# Patient Record
Sex: FEMALE | Race: BLACK OR AFRICAN AMERICAN | URBAN - METROPOLITAN AREA
[De-identification: names, ages, dates, MRNs, and addresses within clinical notes are randomized per-mention and may not be internally consistent; named-entity substitution may affect disease eponyms.]

---

## 2021-03-01 ENCOUNTER — EMERGENCY (EMERGENCY)
Facility: HOSPITAL | Age: 41
LOS: 0 days | Discharge: HOME | End: 2021-03-01
Attending: EMERGENCY MEDICINE | Admitting: EMERGENCY MEDICINE
Payer: MEDICAID

## 2021-03-01 VITALS
OXYGEN SATURATION: 99 % | WEIGHT: 279.99 LBS | SYSTOLIC BLOOD PRESSURE: 135 MMHG | DIASTOLIC BLOOD PRESSURE: 83 MMHG | RESPIRATION RATE: 17 BRPM | TEMPERATURE: 98 F | HEART RATE: 63 BPM
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
## 2021-03-01 DIAGNOSIS — J45.909 UNSPECIFIED ASTHMA, UNCOMPLICATED: ICD-10-CM

## 2021-03-01 DIAGNOSIS — S59.901A UNSPECIFIED INJURY OF RIGHT ELBOW, INITIAL ENCOUNTER: ICD-10-CM
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
## 2021-03-01 DIAGNOSIS — S09.90XA UNSPECIFIED INJURY OF HEAD, INITIAL ENCOUNTER: ICD-10-CM

## 2021-03-01 DIAGNOSIS — M25.572 PAIN IN LEFT ANKLE AND JOINTS OF LEFT FOOT: ICD-10-CM

## 2021-03-01 DIAGNOSIS — S99.912A UNSPECIFIED INJURY OF LEFT ANKLE, INITIAL ENCOUNTER: ICD-10-CM

## 2021-03-01 DIAGNOSIS — S80.912A UNSPECIFIED SUPERFICIAL INJURY OF LEFT KNEE, INITIAL ENCOUNTER: ICD-10-CM

## 2021-03-01 PROCEDURE — 73630 X-RAY EXAM OF FOOT: CPT | Mod: 26,LT

## 2021-03-01 PROCEDURE — 73600 X-RAY EXAM OF ANKLE: CPT | Mod: 26,LT

## 2021-03-01 PROCEDURE — 73070 X-RAY EXAM OF ELBOW: CPT | Mod: 26,RT

## 2021-03-01 PROCEDURE — 73562 X-RAY EXAM OF KNEE 3: CPT | Mod: 26,LT

## 2021-03-01 PROCEDURE — 99284 EMERGENCY DEPT VISIT MOD MDM: CPT

## 2021-03-01 RX ORDER — IBUPROFEN 200 MG
600 TABLET ORAL ONCE
Refills: 0 | Status: COMPLETED | OUTPATIENT
Start: 2021-03-01 | End: 2021-03-01

## 2021-03-01 RX ORDER — METHOCARBAMOL 500 MG/1
1 TABLET, FILM COATED ORAL
Qty: 21 | Refills: 0
Start: 2021-03-01 | End: 2021-03-07

## 2021-03-01 RX ORDER — IBUPROFEN 200 MG
1 TABLET ORAL
Qty: 21 | Refills: 0
Start: 2021-03-01 | End: 2021-03-07

## 2021-03-01 RX ADMIN — Medication 600 MILLIGRAM(S): at 11:54

## 2021-03-01 NOTE — ED ADULT TRIAGE NOTE - CHIEF COMPLAINT QUOTE
pt c/o L ankle pain , R elbow and head pain s/p trip and fall down ramp while visiting a patients home

## 2021-03-01 NOTE — ED PROVIDER NOTE - CARE PLAN
Principal Discharge DX:	Fall   Principal Discharge DX:	Fall  Secondary Diagnosis:	Knee injury, left, initial encounter  Secondary Diagnosis:	Ankle injury, left, initial encounter  Secondary Diagnosis:	Elbow injury, right, initial encounter  Secondary Diagnosis:	CHI (closed head injury), initial encounter

## 2021-03-01 NOTE — ED PROVIDER NOTE - OBJECTIVE STATEMENT
H 39 y/o female with hx Asthma presents to the ED c/o "I slipped on wet floor and hurt my left ankle/ foot/ knee and right elbow. I hit my head too but I didn't pass out." no LOC/ dizziness/ nausea/ vomit/ weakness

## 2021-03-01 NOTE — ED PROVIDER NOTE - ATTENDING CONTRIBUTION TO CARE
39 y/o female h/o asthma, BTL, non-smoker presents s/p slip and fall on ramp outside a client's house PTA, states hit back of head, c/o rt elbow, lft knee / ankle / foot pain, sx are constant, worse with certain movements and position, better with rest, denies fever, tactile temp, chills, paresthesias, focal weakness, or other associated complaints at present, denies LOC, n/v, visual complaints, paresthesias or other complaints at present.     No old chart available for review.  I have reviewed and agree with the nursing note, except as documented in my note.    VSS, awake, alert, non-toxic appearing, Head NC / NT, PERRL / EOMI, no hemotympanum, no abrasions or lacerations, chest CTAB, chest wall NT, no w/r/r, +S1/S2, RRR, no m/r/g, abdomen soft, NT, ND, +BS, able to voluntarily actively rotate neck 45 degrees left and right on request, no midline spinal tenderness, no CVA tenderness, FROM x 4, no bony point tenderness, motor and sensation intact x 4, NV intact x 4, alert and oriented to person, place and time, clear speech, extremity strength is 5/5 and equal.

## 2021-03-01 NOTE — ED PROVIDER NOTE - NSFOLLOWUPINSTRUCTIONS_ED_ALL_ED_FT
Closed Head Injury    A closed head injury is an injury to your head that may or may not involve a traumatic brain injury (TBI). Symptoms of TBI can be short or long lasting and include headache, dizziness, interference with memory or speech, fatigue, confusion, changes in sleep, mood changes, nausea, depression/anxiety, and dulling of senses. Make sure to obtain proper rest which includes getting plenty of sleep, avoiding excessive visual stimulation, and avoiding activities that may cause physical or mental stress. Avoid any situation where there is potential for another head injury, including sports.    SEEK IMMEDIATE MEDICAL CARE IF YOU HAVE ANY OF THE FOLLOWING SYMPTOMS: unusual drowsiness, vomiting, severe dizziness, seizures, lightheadedness, muscular weakness, different pupil sizes, visual changes, or clear or bloody discharge from your ears or nose.    Ankle Sprain    An ankle sprain is a stretch or tear in one of the tough, fiber-like tissues (ligaments) in the ankle. The ligaments in your ankle help to hold the bones of the ankle together.     CAUSES  This condition is often caused by stepping on or falling on the outer edge of the foot.    RISK FACTORS  This condition is more likely to develop in people who play sports.    SYMPTOMS  Symptoms of this condition include:    Pain in your ankle.  Swelling.  Bruising. Bruising may develop right after you sprain your ankle or 1–2 days later.  Trouble standing or walking, especially when you turn or change directions.    DIAGNOSIS  This condition is diagnosed with a physical exam. During the exam, your health care provider will press on certain parts of your foot and ankle and try to move them in certain ways. X-rays may be taken to see how severe the sprain is and to check for broken bones.    TREATMENT  This condition may be treated with:    A brace. This is used to keep the ankle from moving until it heals.  An elastic bandage. This is used to support the ankle.  Crutches.  Pain medicine.  Surgery. This may be needed if the sprain is severe.  Physical therapy. This may help to improve the range of motion in the ankle.    HOME CARE INSTRUCTIONS  Rest your ankle.  Take over-the-counter and prescription medicines only as told by your health care provider.  For 2–3 days, keep your ankle raised (elevated) above the level of your heart as much as possible.  If directed, apply ice to the area:  Put ice in a plastic bag.  Place a towel between your skin and the bag.  Leave the ice on for 20 minutes, 2–3 times a day.  If you were given a brace:  Wear it as directed.  Remove it to shower or bathe.  Try not to move your ankle much, but wiggle your toes from time to time. This helps to prevent swelling.  If you were given an elastic bandage (dressing):  Remove it to shower or bathe.  Try not to move your ankle much, but wiggle your toes from time to time. This helps to prevent swelling.  Adjust the dressing to make it more comfortable if it feels too tight.  Loosen the dressing if you have numbness or tingling in your foot, or if your foot becomes cold and blue.  If you have crutches, use them as told by your health care provider. Continue to use them until you can walk without feeling pain in your ankle.    SEEK MEDICAL CARE IF:  You have rapidly increasing bruising or swelling.  Your pain is not relieved with medicine.    SEEK IMMEDIATE MEDICAL CARE IF:  Your toes or foot becomes numb or blue.  You have severe pain that gets worse.    ADDITIONAL NOTES AND INSTRUCTIONS    Please follow up with your Primary MD in 24-48 hr.  Seek immediate medical care for any new/worsening signs or symptoms.     RICE for Routine Care of Injuries  The routine care of many injuries includes rest, ice, compression, and elevation (RICE therapy). RICE therapy is often recommended for injuries to soft tissues, such as a muscle strain, ligament injuries, bruises, and overuse injuries. It can also be used for some bony injuries. Using RICE therapy can help to relieve pain, lessen swelling, and enable your body to heal.    Rest  Rest is required to allow your body to heal. This usually involves reducing your normal activities and avoiding use of the injured part of your body. Generally, you can return to your normal activities when you are comfortable and have been given permission by your health care provider.    Ice  Image   Icing your injury helps to keep the swelling down, and it lessens pain. Do not apply ice directly to your skin.    Put ice in a plastic bag.  Place a towel between your skin and the bag.  Leave the ice on for 20 minutes, 2–3 times a day.    Do this for as long as you are directed by your health care provider.    Compression  Compression means putting pressure on the injured area. Compression helps to keep swelling down, gives support, and helps with discomfort. Compression may be done with an elastic bandage. If an elastic bandage has been applied, follow these general tips:    Remove and reapply the bandage every 3–4 hours or as directed by your health care provider.  Make sure the bandage is not wrapped too tightly, because this can cut off circulation. If part of your body beyond the bandage becomes blue, numb, cold, swollen, or more painful, your bandage is most likely too tight. If this occurs, remove your bandage and reapply it more loosely.  See your health care provider if the bandage seems to be making your problems worse rather than better.    Elevation  Elevation means keeping the injured area raised. This helps to lessen swelling and decrease pain. If possible, your injured area should be elevated at or above the level of your heart or the center of your chest.    When should I seek medical care?  If your pain and swelling continue.  If your symptoms are getting worse rather than improving.  These symptoms may indicate that further evaluation or further X-rays are needed. Sometimes, X-rays may not show a small broken bone (fracture) until a number of days later. Make a follow-up appointment with your health care provider.    When should I seek immediate medical care?  If you have sudden severe pain at or below the area of your injury.  If you have redness or increased swelling around your injury.  If you have tingling or numbness at or below the area of your injury that does not improve after you

## 2021-03-01 NOTE — ED PROVIDER NOTE - SECONDARY DIAGNOSIS.
Ankle injury, left, initial encounter Elbow injury, right, initial encounter CHI (closed head injury), initial encounter Knee injury, left, initial encounter

## 2021-03-01 NOTE — ED ADULT NURSE NOTE - NSFALLRSKHARMRISK_ED_ALL_ED
How Severe Are Your Spot(S)?: mild What Is The Reason For Today's Visit?: Full Body Skin Examination What Is The Reason For Today's Visit? (Being Monitored For X): concerning skin lesions on an annual basis yes

## 2021-03-01 NOTE — ED PROVIDER NOTE - PATIENT PORTAL LINK FT
You can access the FollowMyHealth Patient Portal offered by [facility] by registering at the following website: [email or web address]. By joining [employer]’s FollowMyHealth portal, you will also be able to view your health information using other applications (apps) compatible with our system.

## 2021-03-01 NOTE — ED PROVIDER NOTE - NSFOLLOWUPCLINICS_GEN_ALL_ED_FT
[facility]  Outpatient Clinic  [address], [place]   Phone: [phone]  Fax:   Follow Up Time:     [facility] ([facility])  Rehabilitation  [facility] [address], [address] [place] [address]  Phone: [phone]  Fax:   Follow Up Time:

## 2021-03-01 NOTE — ED ADULT NURSE NOTE - NSIMPLEMENTINTERV_GEN_ALL_ED
Implemented All Fall with Harm Risk Interventions:  [place] to call system. Call bell, personal items and telephone within reach. Instruct patient to call for assistance. Room bathroom lighting operational. Non-slip footwear when patient is off stretcher. Physically safe environment: no spills, clutter or unnecessary equipment. Stretcher in lowest position, wheels locked, appropriate side rails in place. Provide visual cue, wrist band, yellow gown, etc. Monitor gait and stability. Monitor for mental status changes and reorient to person, place, and time. Review medications for side effects contributing to fall risk. Reinforce activity limits and safety measures with patient and family. Provide visual clues: red socks.

## 2021-10-26 ENCOUNTER — EMERGENCY (EMERGENCY)
Facility: HOSPITAL | Age: 41
LOS: 0 days | Discharge: HOME | End: 2021-10-27
Attending: EMERGENCY MEDICINE | Admitting: EMERGENCY MEDICINE
Payer: MEDICAID

## 2021-10-26 VITALS
SYSTOLIC BLOOD PRESSURE: 133 MMHG | WEIGHT: 186.07 LBS | OXYGEN SATURATION: 99 % | DIASTOLIC BLOOD PRESSURE: 73 MMHG | TEMPERATURE: 98 F | RESPIRATION RATE: 20 BRPM | HEART RATE: 101 BPM

## 2021-10-26 DIAGNOSIS — M25.562 PAIN IN LEFT KNEE: ICD-10-CM

## 2021-10-26 DIAGNOSIS — M54.59 OTHER LOW BACK PAIN: ICD-10-CM
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
## 2021-10-26 DIAGNOSIS — G89.29 OTHER CHRONIC PAIN: ICD-10-CM
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
## 2021-10-26 DIAGNOSIS — M25.572 PAIN IN LEFT ANKLE AND JOINTS OF LEFT FOOT: ICD-10-CM

## 2021-10-26 DIAGNOSIS — J45.909 UNSPECIFIED ASTHMA, UNCOMPLICATED: ICD-10-CM

## 2021-10-26 PROCEDURE — 99284 EMERGENCY DEPT VISIT MOD MDM: CPT

## 2021-10-26 RX ORDER — KETOROLAC TROMETHAMINE 30 MG/ML
60 SYRINGE (ML) INJECTION ONCE
Refills: 0 | Status: DISCONTINUED | OUTPATIENT
Start: 2021-10-26 | End: 2021-10-26

## 2021-10-26 RX ORDER — KETOROLAC TROMETHAMINE 30 MG/ML
30 SYRINGE (ML) INJECTION ONCE
Refills: 0 | Status: DISCONTINUED | OUTPATIENT
Start: 2021-10-26 | End: 2021-10-26

## 2021-10-26 RX ORDER — METHOCARBAMOL 500 MG/1
2 TABLET, FILM COATED ORAL
Qty: 30 | Refills: 0
Start: 2021-10-26 | End: 2021-10-30

## 2021-10-26 RX ORDER — METHOCARBAMOL 500 MG/1
1000 TABLET, FILM COATED ORAL ONCE
Refills: 0 | Status: COMPLETED | OUTPATIENT
Start: 2021-10-26 | End: 2021-10-26

## 2021-10-26 RX ADMIN — METHOCARBAMOL 1000 MILLIGRAM(S): 500 TABLET, FILM COATED ORAL at 20:36

## 2021-10-26 RX ADMIN — Medication 60 MILLIGRAM(S): at 20:40

## 2021-10-26 NOTE — ED PROVIDER NOTE - OBJECTIVE STATEMENT
40 yo F pmhx asthma presenting to the ED for evaluation of R sided lower back pain, pt had mechanical fall 4 days ago, states she fell backwards off a chair, denies any head trauma, loc, no anticoag use. Pt reports she has chronic L ankle and L knee pain, L knee locks sometimes, pt states she fell a year ago and has chronic pain, pt saw Dr Staples earlier today for L knee and L ankle pain, pt states she was not prescribed anything for pain. Pt has no difficulty ambulating. Denies any fever, chills, nausea, vomiting, weakness, numbness/tingling, incontinence, saddle anesthesia.

## 2021-10-26 NOTE — ED ADULT NURSE NOTE - OBJECTIVE STATEMENT
Pt states pt fell in march and injured L ankle. Pt c/o difficulty ambulating at times due to LLE weakness.

## 2021-10-26 NOTE — ED PROVIDER NOTE - PHYSICAL EXAMINATION
GENERAL: Well-nourished, Well-developed. NAD.  HEAD: No visible or palpable bumps or hematomas. No ecchymosis behind ears B/L.  Eyes: PERRLA, EOMI. No asymmetry. No nystagmus. No conjunctival injection. Non-icteric sclera.  ENMT: MMM.   Neck: Supple. No cervical midline TTP. No paravertebral TTP to traps. FROM  CVS: RRR. Normal S1,S2. No murmurs appreciated on auscultation   RESP: No use of accessory muscles. Chest rise symmetrical with good expansion. Lungs clear to auscultation B/L. No wheezing, rales, or rhonchi auscultated.  GI: Normal auscultation of bowel sounds in all 4 quadrants. Soft, Nontender, Nondistended. No guarding or rebound tenderness. No CVAT B/L.  MSK: Extremities w/o deformity or ttp. No visible signs of trauma such as ecchymosis, erythema, or swelling. FROM of upper and lower extremities B/L. No midline spinal TTP. FROM of back with flexion and extension. (+)R sided paraspinal lumbar ttp.   Skin: Warm, Dry. No rashes or lesions. Good cap refill < 2 sec B/L.  EXT: Radial and pedal pulses present B/L. No calf tenderness or swelling B/L. No palpable cords. No pedal edema B/L.  Neuro: AA&O x 3. Sensation grossly intact. Strength 5/5 B/L. Gait within normal limits.

## 2021-10-26 NOTE — ED ADULT TRIAGE NOTE - CHIEF COMPLAINT QUOTE
Pt presents to ED s/p fall x 2. No LOC. No A/C. Pt states pt fell in march and injured L ankle. Pt c/o difficulty ambulating at times due to LLE weakness.

## 2021-10-26 NOTE — ED PROVIDER NOTE - NSFOLLOWUPCLINICS_GEN_ALL_ED_FT
[facility] ([facility])  Rehabilitation  [facility] [address], [address] [place] [address]  Phone: [phone]  Fax:   Follow Up Time: 1-3 Days

## 2021-10-26 NOTE — ED PROVIDER NOTE - NS ED ROS FT
Constitutional: (-) fever (-) malaise (-) diaphoresis (-) chills (-) wt. loss (-) body aches (-) night sweats  Eyes: (-) visual changes (-) eye pain (-) eye discharge (-) photophobia (-) FB sensation  Neck: (-) neck pain (-) neck stiffness  Cardiac: (-) chest pain  (-) palpitations (-) syncope (-) edema  Respiratory: (-) cough (-) SOB (-) EUGENE  GI: (-) nausea (-) vomiting (-) diarrhea (-) abdominal pain   : (-) dysuria (-) increased frequency  (-) hematuria (-) incontinence  MS: (+) back pain (-) myalgia (-) muscle weakness (-)  joint pain  Neuro: (-) headache (-) dizziness (-) numbness/tingling to extremities B/L (-) weakness (-) LOC (-) head injury (-) AMS (-) saddle anesthesia (-) tremors  Skin: (-) rash (-) laceration    Except as documented in the HPI, all other systems are negative.

## 2021-10-26 NOTE — ED PROVIDER NOTE - ATTENDING CONTRIBUTION TO CARE
41 y.o. F, PMH of asthma, presenting to the ED for evaluation of R sided lower back pain, pt had mechanical fall 4 days ago, states she fell backwards off a chair, denies any head trauma, LOC. No anticoagulant use. Pt has no difficulty ambulating. Denies any fever, chills, nausea, vomiting, weakness, numbness/tingling, incontinence, saddle anesthesia. On exam, pt in NAD, AAOx3, head NC/AT, CN II-XII intact, lungs CTA B/L, CV S1S2 regular, abdomen soft/NT/ND/(+)BS, back (-) midline tenderness, (+) right lower paraspinal tenderness, ext (-) edema, (-) saddle anesthesia, motor 5/5x4, sensation intact. Most likely MSK. Will d/c.

## 2021-10-27 PROBLEM — J45.909 UNSPECIFIED ASTHMA, UNCOMPLICATED: Chronic | Status: ACTIVE | Noted: 2021-03-01

## 2022-08-09 PROBLEM — Z00.00 ENCOUNTER FOR PREVENTIVE HEALTH EXAMINATION: Status: ACTIVE | Noted: 2022-08-09

## 2022-09-09 ENCOUNTER — APPOINTMENT (OUTPATIENT)
Dept: ORTHOPEDIC SURGERY | Facility: CLINIC | Age: 42
End: 2022-09-09

## 2022-10-28 ENCOUNTER — APPOINTMENT (OUTPATIENT)
Dept: ORTHOPEDIC SURGERY | Facility: CLINIC | Age: 42
End: 2022-10-28

## 2022-10-28 PROBLEM — Z00.00 ENCOUNTER FOR PREVENTIVE HEALTH EXAMINATION: Status: ACTIVE | Noted: 2022-10-28

## 2022-11-21 ENCOUNTER — EMERGENCY (EMERGENCY)
Facility: HOSPITAL | Age: 42
LOS: 0 days | Discharge: HOME | End: 2022-11-22
Attending: EMERGENCY MEDICINE | Admitting: EMERGENCY MEDICINE

## 2022-11-21 VITALS
RESPIRATION RATE: 18 BRPM | HEART RATE: 92 BPM | TEMPERATURE: 99 F | HEIGHT: 68 IN | WEIGHT: 293 LBS | OXYGEN SATURATION: 97 % | DIASTOLIC BLOOD PRESSURE: 73 MMHG | SYSTOLIC BLOOD PRESSURE: 137 MMHG

## 2022-11-21 DIAGNOSIS — J45.909 UNSPECIFIED ASTHMA, UNCOMPLICATED: ICD-10-CM

## 2022-11-21 DIAGNOSIS — R07.89 OTHER CHEST PAIN: ICD-10-CM

## 2022-11-21 DIAGNOSIS — Z20.822 CONTACT WITH AND (SUSPECTED) EXPOSURE TO COVID-19: ICD-10-CM

## 2022-11-21 DIAGNOSIS — R07.9 CHEST PAIN, UNSPECIFIED: ICD-10-CM

## 2022-11-21 LAB
BASOPHILS # BLD AUTO: 0.05 K/UL — SIGNIFICANT CHANGE UP (ref 0–0.2)
BASOPHILS NFR BLD AUTO: 0.8 % — SIGNIFICANT CHANGE UP (ref 0–1)
EOSINOPHIL # BLD AUTO: 0.21 K/UL — SIGNIFICANT CHANGE UP (ref 0–0.7)
EOSINOPHIL NFR BLD AUTO: 3.3 % — SIGNIFICANT CHANGE UP (ref 0–8)
HCT VFR BLD CALC: 35.5 % — LOW (ref 37–47)
HGB BLD-MCNC: 11.4 G/DL — LOW (ref 12–16)
IMM GRANULOCYTES NFR BLD AUTO: 0.2 % — SIGNIFICANT CHANGE UP (ref 0.1–0.3)
LYMPHOCYTES # BLD AUTO: 2.82 K/UL — SIGNIFICANT CHANGE UP (ref 1.2–3.4)
LYMPHOCYTES # BLD AUTO: 43.7 % — SIGNIFICANT CHANGE UP (ref 20.5–51.1)
MCHC RBC-ENTMCNC: 28.9 PG — SIGNIFICANT CHANGE UP (ref 27–31)
MCHC RBC-ENTMCNC: 32.1 G/DL — SIGNIFICANT CHANGE UP (ref 32–37)
MCV RBC AUTO: 89.9 FL — SIGNIFICANT CHANGE UP (ref 81–99)
MONOCYTES # BLD AUTO: 0.4 K/UL — SIGNIFICANT CHANGE UP (ref 0.1–0.6)
MONOCYTES NFR BLD AUTO: 6.2 % — SIGNIFICANT CHANGE UP (ref 1.7–9.3)
NEUTROPHILS # BLD AUTO: 2.97 K/UL — SIGNIFICANT CHANGE UP (ref 1.4–6.5)
NEUTROPHILS NFR BLD AUTO: 45.8 % — SIGNIFICANT CHANGE UP (ref 42.2–75.2)
NRBC # BLD: 0 /100 WBCS — SIGNIFICANT CHANGE UP (ref 0–0)
PLATELET # BLD AUTO: 297 K/UL — SIGNIFICANT CHANGE UP (ref 130–400)
RBC # BLD: 3.95 M/UL — LOW (ref 4.2–5.4)
RBC # FLD: 14.5 % — SIGNIFICANT CHANGE UP (ref 11.5–14.5)
TROPONIN T SERPL-MCNC: <0.01 NG/ML — SIGNIFICANT CHANGE UP
WBC # BLD: 6.46 K/UL — SIGNIFICANT CHANGE UP (ref 4.8–10.8)
WBC # FLD AUTO: 6.46 K/UL — SIGNIFICANT CHANGE UP (ref 4.8–10.8)

## 2022-11-21 PROCEDURE — 99285 EMERGENCY DEPT VISIT HI MDM: CPT

## 2022-11-21 PROCEDURE — 93010 ELECTROCARDIOGRAM REPORT: CPT

## 2022-11-21 RX ORDER — KETOROLAC TROMETHAMINE 30 MG/ML
15 SYRINGE (ML) INJECTION ONCE
Refills: 0 | Status: DISCONTINUED | OUTPATIENT
Start: 2022-11-21 | End: 2022-11-21

## 2022-11-21 RX ADMIN — Medication 15 MILLIGRAM(S): at 23:14

## 2022-11-21 NOTE — ED PROVIDER NOTE - NSFOLLOWUPINSTRUCTIONS_ED_ALL_ED_FT
Our Emergency Department Referral Coordinators will be reaching out ot you in the next 24-48 hours from 9:00am to 5:00pm (Monday to Friday) with a follow up appointment. Please expect a phone call from the hospital in that time frame. If you do not receive a call or if you have any questions or concerns, you can reach them at [phone] or [phone].     Chest Pain    Chest pain can be caused by many different conditions which may or may not be dangerous. Causes include heartburn, lung infections, heart attack, blood clot in lungs, skin infections, strain or damage to muscle, cartilage, or bones, etc. In addition to a history and physical examination, an electrocardiogram (ECG) or other lab tests may have been performed to determine the cause of your chest pain. Follow up with your primary care provider or with a cardiologist as instructed.     SEEK IMMEDIATE MEDICAL CARE IF YOU HAVE ANY OF THE FOLLOWING SYMPTOMS: worsening chest pain, coughing up blood, unexplained back/neck/jaw pain, severe abdominal pain, dizziness or lightheadedness, fainting, shortness of breath, sweaty or clammy skin, vomiting, or racing heart beat. These symptoms may represent a serious problem that is an emergency. Do not wait to see if the symptoms will go away. Get medical help right away. Call 911 and do not drive yourself to the hospital.

## 2022-11-21 NOTE — ED PROVIDER NOTE - CLINICAL SUMMARY MEDICAL DECISION MAKING FREE TEXT BOX
Patient presents with right-sided chest pain labs EKG chest x-ray done.  No acute findings.  Positive right chest wall tenderness.  Discharged with PMD and cardiology follow-up.  Return precautions discussed.

## 2022-11-21 NOTE — ED PROVIDER NOTE - ATTENDING APP SHARED VISIT CONTRIBUTION OF CARE
42-year-old female no past medical history presents with right-sided chest pain.  Patient states that while at the store she started to develop right-sided chest pain, constant, nonradiating, 8 out of 10.  No shortness of breath.  No nausea or vomiting.  No abdominal pain.  No fevers or recent illness.  No known history of smoking.  No family history of heart disease.  No leg swelling or pain.  No recent traveling or surgeries.    CONSTITUTIONAL: Well-developed; well-nourished; in no acute distress.   SKIN: warm, dry  HEAD: Normocephalic; atraumatic.  EYES: PERRL, EOMI, no conjunctival erythema  ENT: No nasal discharge; airway clear.  NECK: Supple; non tender.  CARD: S1, S2 normal;  Regular rate and rhythm.   RESP: No wheezes, rales or rhonchi. + right chest wall tenderness.   ABD: soft non tender, non distended, no rebound or guarding  EXT: Normal ROM.  5/5 strength in all 4 extremities   LYMPH: No acute cervical adenopathy.  NEURO: Alert, oriented, grossly unremarkable. neurovascularly intact  PSYCH: Cooperative, appropriate.

## 2022-11-21 NOTE — ED PROVIDER NOTE - PHYSICAL EXAMINATION
VITAL SIGNS: I have reviewed nursing notes and confirm.  CONSTITUTIONAL: Well-developed; well-nourished; in no acute distress.  SKIN: Skin exam is warm and dry, no acute rash.  HEAD: Normocephalic; atraumatic.  EYES: Conjunctiva and sclera clear.  ENT: No nasal discharge; airway clear.   CARD: S1, S2 normal; no murmurs, gallops, or rubs. Regular rate and rhythm. (+) mild R chest wall TTP  RESP: No wheezes, rales or rhonchi. Speaking in full sentences.   ABD: Normal bowel sounds; soft; non-distended; non-tender; No rebound or guarding. No CVA tenderness.  EXT: Normal ROM. No clubbing, cyanosis or edema. No calf TTP or swelling.   NEURO: Alert, oriented. Grossly unremarkable. No focal deficits.

## 2022-11-21 NOTE — ED PROVIDER NOTE - PROGRESS NOTE DETAILS
Pt reports improvement in symptoms, currently asymptomatic. Discussed results and provided copy to pt. Pt understands to follow-up with PMD/cardio. Pt understands to return to ED if symptoms return/worsen. Agreeable to discharge.

## 2022-11-21 NOTE — ED PROVIDER NOTE - NS ED ATTENDING STATEMENT MOD
This was a shared visit with the GRICELDA. I reviewed and verified the documentation and independently performed the documented:

## 2022-11-21 NOTE — ED PROVIDER NOTE - PATIENT PORTAL LINK FT
You can access the FollowMyHealth Patient Portal offered by [facility] by registering at the following website: [email or web address]. By joining [employer]’s FollowMyHealth portal, you will also be able to view your health information using other applications (apps) compatible with our system.

## 2022-11-21 NOTE — ED ADULT TRIAGE NOTE - WEIGHT IN LBS
Levothyroxine      Last Written Prescription Date: 09/22/2016  Last Quantity: 90,11/12/2016 # refills: 3  Last Office Visit with [facility] or [facility] prescribing provider: 12/6/2016-Dr Goddard        TSH   Date Value Ref Range Status   09/22/2016 1.55 0.40 - 4.00 mU/L Final        300

## 2022-11-21 NOTE — ED ADULT TRIAGE NOTE - INTERNATIONAL TRAVEL
"Occupational Therapy   Treatment    Name: Ed Patton  MRN: [id number]  Admitting Diagnosis:  Sepsis due to methicillin resistant Staphylococcus aureus  4 Days Post-Op    Recommendations:     Discharge Recommendations: nursing facility, skilled  Discharge Equipment Recommendations:  (to be determined closer to discharge home)  Barriers to discharge:  Decreased caregiver support    Assessment:     Ed Patton is a 65 y.o. male with a medical diagnosis of Sepsis due to methicillin resistant Staphylococcus aureus. Performance deficits affecting function are weakness, impaired endurance, impaired self care skills, impaired functional mobilty, gait instability, impaired balance, decreased safety awareness, pain. Patient tolerated treatment session, but appeared to be a little bit in a fog and space out during OT session. This therapist asked patient if he was dizzy, but denied dizziness. Patient did verbalize that he was frustrated and stated "it is one thing after another" (patient stated this with this therapist in previous therapy session also). Patient would benefit from continued skilled acute OT x/wk to improve functional mobility, increase independence with ADLs, and address established goals. Recommending     once medically appropriate for discharge to increase maximal independence, reduce burden of care, and ensure safety.     Rehab Prognosis:  Good; patient would benefit from acute skilled OT services to address these deficits and reach maximum level of function.       Plan:     Patient to be seen 3 x/week to address the above listed problems via self-care/home management, therapeutic activities, therapeutic exercises  · Plan of Care Expires: 10/08/20  · Plan of Care Reviewed with: patient    Subjective     Pain/Comfort:  · Pain Rating 1: 5/10  · Location - Side 1: Right  · Location - Orientation 1: generalized  · Location 1: knee  · Pain Addressed 1: Pre-medicate for activity, Reposition, " Distraction  · Pain Rating Post-Intervention 1: 5/10    Objective:     Communicated with: NSG prior to session.  Patient found sitting EOB with oxygen, telemetry, pulse ox (continuous) upon OT entry to room.    General Precautions: Standard, fall, contact   Orthopedic Precautions:LLE weight bearing as tolerated   Braces:       Occupational Performance:     Bed Mobility:    · Patient completed Rolling/Turning to Left with  minimum assistance  · Patient completed Rolling/Turning to Right with minimum assistance  · Patient completed Sit to Supine with minimum assistance     Functional Mobility/Transfers:  · Patient completed Sit <> Stand Transfer with maximal assistance and of 2 persons  with  rolling walker     Activities of Daily Living:  · Grooming: minimum assistance oral care sitting EOB. Patient wanted to brush his teeth but was sort of in a fog and would space out during task and therapist placed toothpaste on toothbrush and put toothbrush in patients hand.   · Lower Body Dressing: total assistance Donning and doffing L DARCO shoe. Donning L sock  · Toileting: total assistance Patient had a BM and needed to be cleaned.    [facility] 6 Click ADL: 15    Treatment & Education:  Role of OT and POC  Safety  ADL retraining  Functional mobility training    Patient left HOB elevated with call button in reach and all needs met (nurse aware).Education:      GOALS:   Multidisciplinary Problems     Occupational Therapy Goals        Problem: Occupational Therapy Goal    Goal Priority Disciplines Outcome Interventions   Occupational Therapy Goal     OT, PT/OT Ongoing, Progressing    Description: Goals to be met by:10/01/2020    Patient will increase functional independence with ADLs by performing:    UE Dressing with [place].  LE Dressing with Stand-by Assistance.  Grooming while seated at sink with [place].  Toileting from bedside commode with Supervision for hygiene and clothing management.   Bathing from  sitting at  sink with Set-up Assistance.  Toilet transfer to bedside commode with Stand-by Assistance, accurate adherence to NWB precautions LLE.                     Time Tracking:     OT Date of Treatment: 09/11/20  OT Start Time: 1516  OT Stop Time: 1540  OT Total Time (min): 24 min    Billable Minutes:Self Care/Home Management 24    ELISEO Stokes  9/11/2020     No

## 2022-11-21 NOTE — ED PROVIDER NOTE - OBJECTIVE STATEMENT
43 yo F with PMHx of asthma presents to the ED c/o mild R sided chest pain that started this morning and has been persisting. Pain feels like tightness, is constant, non-radiating and worse with palpation. She denies smoking hx, family hx of CAD and is not on OCPs. She denies other complaints. Pt denies fever, chills, nausea, vomiting, abdominal pain, diarrhea, headache, dizziness, weakness, SOB, back pain, LOC, trauma, urinary symptoms, cough, calf pain/swelling, recent travel, recent surgery.

## 2022-11-21 NOTE — ED ADULT TRIAGE NOTE - CHIEF COMPLAINT QUOTE
pt sts having right sided cp all day, described as tightness, constant, no modifying factors, non-radiating, denies sob,cough,fever,n/v/d

## 2022-11-22 VITALS
TEMPERATURE: 98 F | HEART RATE: 77 BPM | OXYGEN SATURATION: 99 % | DIASTOLIC BLOOD PRESSURE: 78 MMHG | RESPIRATION RATE: 19 BRPM | SYSTOLIC BLOOD PRESSURE: 124 MMHG

## 2022-11-22 LAB
ALBUMIN SERPL ELPH-MCNC: 4.1 G/DL — SIGNIFICANT CHANGE UP (ref 3.5–5.2)
ALP SERPL-CCNC: 84 U/L — SIGNIFICANT CHANGE UP (ref 30–115)
ALT FLD-CCNC: 20 U/L — SIGNIFICANT CHANGE UP (ref 0–41)
ANION GAP SERPL CALC-SCNC: 7 MMOL/L — SIGNIFICANT CHANGE UP (ref 7–14)
AST SERPL-CCNC: 14 U/L — SIGNIFICANT CHANGE UP (ref 0–41)
BILIRUB SERPL-MCNC: <0.2 MG/DL — SIGNIFICANT CHANGE UP (ref 0.2–1.2)
BUN SERPL-MCNC: 13 MG/DL — SIGNIFICANT CHANGE UP (ref 10–20)
CALCIUM SERPL-MCNC: 8.9 MG/DL — SIGNIFICANT CHANGE UP (ref 8.4–10.5)
CHLORIDE SERPL-SCNC: 101 MMOL/L — SIGNIFICANT CHANGE UP (ref 98–110)
CO2 SERPL-SCNC: 28 MMOL/L — SIGNIFICANT CHANGE UP (ref 17–32)
CREAT SERPL-MCNC: 0.6 MG/DL — LOW (ref 0.7–1.5)
EGFR: 116 ML/MIN/1.73M2 — SIGNIFICANT CHANGE UP
GLUCOSE SERPL-MCNC: 120 MG/DL — HIGH (ref 70–99)
POTASSIUM SERPL-MCNC: 4.4 MMOL/L — SIGNIFICANT CHANGE UP (ref 3.5–5)
POTASSIUM SERPL-SCNC: 4.4 MMOL/L — SIGNIFICANT CHANGE UP (ref 3.5–5)
PROT SERPL-MCNC: 7.5 G/DL — SIGNIFICANT CHANGE UP (ref 6–8)
SARS-COV-2 RNA SPEC QL NAA+PROBE: SIGNIFICANT CHANGE UP
SODIUM SERPL-SCNC: 136 MMOL/L — SIGNIFICANT CHANGE UP (ref 135–146)

## 2022-11-22 PROCEDURE — 71045 X-RAY EXAM CHEST 1 VIEW: CPT | Mod: 26

## 2022-11-22 RX ADMIN — Medication 15 MILLIGRAM(S): at 00:22

## 2022-12-01 ENCOUNTER — APPOINTMENT (OUTPATIENT)
Dept: CARDIOLOGY | Facility: CLINIC | Age: 42
End: 2022-12-01

## 2022-12-01 ENCOUNTER — RESULT CHARGE (OUTPATIENT)
Age: 42
End: 2022-12-01

## 2023-01-31 ENCOUNTER — EMERGENCY (EMERGENCY)
Facility: HOSPITAL | Age: 43
LOS: 0 days | Discharge: HOME | End: 2023-01-31
Attending: EMERGENCY MEDICINE | Admitting: EMERGENCY MEDICINE
Payer: MEDICAID

## 2023-01-31 VITALS
RESPIRATION RATE: 18 BRPM | OXYGEN SATURATION: 100 % | DIASTOLIC BLOOD PRESSURE: 86 MMHG | HEART RATE: 88 BPM | SYSTOLIC BLOOD PRESSURE: 137 MMHG | TEMPERATURE: 99 F
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
## 2023-01-31 DIAGNOSIS — S91.331A PUNCTURE WOUND WITHOUT FOREIGN BODY, RIGHT FOOT, INITIAL ENCOUNTER: ICD-10-CM
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
## 2023-01-31 DIAGNOSIS — M79.671 PAIN IN RIGHT FOOT: ICD-10-CM

## 2023-01-31 DIAGNOSIS — J45.909 UNSPECIFIED ASTHMA, UNCOMPLICATED: ICD-10-CM

## 2023-01-31 PROCEDURE — 99284 EMERGENCY DEPT VISIT MOD MDM: CPT

## 2023-01-31 PROCEDURE — 73630 X-RAY EXAM OF FOOT: CPT | Mod: 26,RT

## 2023-01-31 RX ORDER — IBUPROFEN 200 MG
600 TABLET ORAL ONCE
Refills: 0 | Status: COMPLETED | OUTPATIENT
Start: 2023-01-31 | End: 2023-01-31

## 2023-01-31 RX ORDER — CIPROFLOXACIN LACTATE 400MG/40ML
500 VIAL (ML) INTRAVENOUS ONCE
Refills: 0 | Status: COMPLETED | OUTPATIENT
Start: 2023-01-31 | End: 2023-01-31

## 2023-01-31 RX ORDER — CIPROFLOXACIN LACTATE 400MG/40ML
1 VIAL (ML) INTRAVENOUS
Qty: 14 | Refills: 0
Start: 2023-01-31 | End: 2023-02-06

## 2023-01-31 RX ADMIN — Medication 600 MILLIGRAM(S): at 04:00

## 2023-01-31 NOTE — ED ADULT NURSE NOTE - NSIMPLEMENTINTERV_GEN_ALL_ED
Implemented All Universal Safety Interventions:  [place] to call system. Call bell, personal items and telephone within reach. Instruct patient to call for assistance. Room bathroom lighting operational. Non-slip footwear when patient is off stretcher. Physically safe environment: no spills, clutter or unnecessary equipment. Stretcher in lowest position, wheels locked, appropriate side rails in place.

## 2023-01-31 NOTE — ED PROVIDER NOTE - NSFOLLOWUPCLINICS_GEN_ALL_ED_FT
[facility]  Podiatry  .  [place]   Phone: [phone]  Fax:   Follow Up Time: 1-3 Days

## 2023-01-31 NOTE — ED PROVIDER NOTE - PATIENT PORTAL LINK FT
You can access the FollowMyHealth Patient Portal offered by [facility] by registering at the following website: [email or web address]. By joining [employer]’s FollowMyHealth portal, you will also be able to view your health information using other applications (apps) compatible with our system.

## 2023-01-31 NOTE — ED PROVIDER NOTE - CLINICAL SUMMARY MEDICAL DECISION MAKING FREE TEXT BOX
Imaging was ordered and reviewed by me.  Appropriate medications for patient's presenting complaints were ordered and effects were reassessed.  Patient's records (prior hospital, ED visit, and/or nursing home notes if available) were reviewed.  Additional history was obtained from EMS, family, and/or PCP (where available).  Escalation to admission/observation was considered.  However patient feels much better and is comfortable with discharge.  Appropriate follow-up was arranged.    42F p/w R foot puncture wound through shoe sole - xr no fx/fb, analgesia & empiric abx, all results d/w pt & copies given, strict return precautions discussed, rec outpt pods f/u

## 2023-01-31 NOTE — ED ADULT TRIAGE NOTE - CHIEF COMPLAINT QUOTE
Pt BIBA from home c/o right foot pain, stepped on a screw tonight around 9p and has been experiencing pain since pulling it out  No active bleeding from site.

## 2023-01-31 NOTE — ED PROVIDER NOTE - OBJECTIVE STATEMENT
42F PMH ASTHMA P/W R FOOT PAIN S/P TRAUMA. WAS WEARING SANDALS AND STEPPED ON A METAL SCREW ~9PM. SUSTAINED PUNCTURE WOUND TO PLANTAR SURFACE OF FOOT. PAIN TO AREA SINCE THEN. NO ACTIVE BLEEDING. NO FOCAL NUMBNESS OR WEAKNESS.

## 2023-01-31 NOTE — ED PROVIDER NOTE - NSFOLLOWUPINSTRUCTIONS_ED_ALL_ED_FT
Puncture Wound    A puncture wound is an injury that is caused by a sharp, thin object that goes through (penetrates) your skin. Usually, a puncture wound does not leave a large opening in your skin, so it may not bleed a lot. However, when you get a puncture wound, dirt or other materials (foreign bodies) can be forced into your wound and break off inside. This increases the chance of infection, such as tetanus.    What are the causes?  Puncture wounds are caused by any sharp, thin object that goes through your skin, such as:  Animal teeth, as with an animal bite.  Sharp, pointed objects, such as nails, splinters of glass, fishhooks, and needles.  What are the signs or symptoms?  Symptoms of a puncture wound include:  Pain.  Bleeding.  Swelling.  Bruising.  Fluid leaking from the wound.  Numbness, tingling, or loss of function.  How is this diagnosed?  This condition is diagnosed with a medical history and physical exam. Your wound will be checked to see if it contains any foreign bodies. You may also have X-rays or other imaging tests.    How is this treated?  Treatment for a puncture wound depends on how serious the wound is. It also depends on whether the wound contains any foreign bodies. Treatment for all types of puncture wounds usually starts with:  Controlling the bleeding.  Washing out the wound with a germ-free (sterile) salt-water solution.  Checking the wound for foreign bodies.  Treatment may also include:  Having the wound opened surgically to remove a foreign object.  Closing the wound with stitches (sutures) if it continues to bleed.  Covering the wound with antibiotic ointments and a bandage (dressing).  Receiving a tetanus shot.  Receiving a rabies vaccine.  Follow these instructions at home:  Medicines     Take or apply over-the-counter and prescription medicines only as told by your health care provider.  If you were prescribed an antibiotic, take or apply it as told by your health care provider. Do not stop using the antibiotic even if your condition improves.  Wound care     There are many ways to close and cover a wound. For example, a wound can be covered with sutures, skin glue, or adhesive strips. Follow instructions from your health care provider about:  How to take care of your wound.  When and how you should change your dressing.  When you should remove your dressing.  Removing whatever was used to close your wound.  Keep the dressing dry as told by your health care provider. Do not take baths, swim, use a hot tub, or do anything that would put your wound underwater until your health care provider approves.  Clean the wound as told by your health care provider.  Do not scratch or pick at the wound.  Check your wound every day for signs of infection. Watch for:  Redness, swelling, or pain.  Fluid, blood, or pus.  General instructions     Raise (elevate) the injured area above the level of your heart while you are sitting or lying down.  If your puncture wound is in your foot, ask your health care provider if you need to avoid putting weight on your foot and for how long.  Keep all follow-up visits as told by your health care provider. This is important.  Contact a health care provider if:  You received a tetanus shot and you have swelling, severe pain, redness, or bleeding at the injection site.  You have a fever.  Your sutures come out.  You notice a bad smell coming from your wound or your dressing.  You notice something coming out of your wound, such as wood or glass.  Your pain is not controlled with medicine.  You have increased redness, swelling, or pain at the site of your wound.  You have fluid, blood, or pus coming from your wound.  You notice a change in the color of your skin near your wound.  You need to change the dressing frequently due to fluid, blood, or pus draining from your wound.  You develop a new rash.  You develop numbness around your wound.  Get help right away if:  You develop severe swelling around your wound.  Your pain suddenly increases and is severe.  You develop painful skin lumps.  You have a red streak going away from your wound.  The wound is on your hand or foot and you cannot properly move a finger or toe.  The wound is on your hand or foot and you notice that your fingers or toes look pale or bluish.  This information is not intended to replace advice given to you by your health care provider. Make sure you discuss any questions you have with your health care provider.

## 2023-01-31 NOTE — ED PROVIDER NOTE - PHYSICAL EXAMINATION
PE:  nad  skin warm, dry  ncat  neck supple  rrr nl s1s2 no mrg  ctab no wrr  abd soft ntnd no palpable masses no rgr  back non-tender no cvat  ext- R foot sm puncture wound noted to plantar surface of mid foot, +ttp, no active bleeding, no palpable fb, foot nvi throughout, dpi, cr<2s; remainder of ext exam nml  neuro aaox3 grossly nf exam

## 2023-02-02 ENCOUNTER — OUTPATIENT (OUTPATIENT)
Dept: OUTPATIENT SERVICES | Facility: HOSPITAL | Age: 43
LOS: 1 days | Discharge: HOME | End: 2023-02-02

## 2023-02-02 ENCOUNTER — APPOINTMENT (OUTPATIENT)
Dept: PODIATRY | Facility: CLINIC | Age: 43
End: 2023-02-02
Payer: MEDICAID

## 2023-02-02 VITALS — WEIGHT: 293 LBS | HEIGHT: 69 IN | BODY MASS INDEX: 43.4 KG/M2

## 2023-02-02 DIAGNOSIS — S99.921A UNSPECIFIED INJURY OF RIGHT FOOT, INITIAL ENCOUNTER: ICD-10-CM

## 2023-02-02 PROCEDURE — 99203 OFFICE O/P NEW LOW 30 MIN: CPT | Mod: 25

## 2023-02-03 PROBLEM — S99.921A INJURY OF FOOT, RIGHT: Status: ACTIVE | Noted: 2023-02-03

## 2023-02-03 NOTE — HISTORY OF PRESENT ILLNESS
[FreeTextEntry1] : 41 y/o female presents with penetrating injury to the right foot. patient stepped on a nail at her home. Incident occurred on saturday. patient went to the ED. xrays negative for foreign body. Patient was d/c with p.o antibiotics. Patient states that she was not given a tetanus shot. Does not recall her last vaccination date.

## 2023-02-03 NOTE — ASSESSMENT
[FreeTextEntry1] : -continue w/ antibiotics\par -xrays reviewed\par -administered tDap vaccine\par -return as needed

## 2023-02-03 NOTE — PHYSICAL EXAM
[General Appearance - Alert] : alert [General Appearance - In No Acute Distress] : in no acute distress [FreeTextEntry1] : TTP at area of puncture site. No open wounds appreciated. no acute signs of infection

## 2023-02-16 ENCOUNTER — OUTPATIENT (OUTPATIENT)
Dept: OUTPATIENT SERVICES | Facility: HOSPITAL | Age: 43
LOS: 1 days | End: 2023-02-16
Payer: MEDICAID

## 2023-02-16 ENCOUNTER — APPOINTMENT (OUTPATIENT)
Dept: PODIATRY | Facility: CLINIC | Age: 43
End: 2023-02-16
Payer: MEDICAID
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
## 2023-02-16 DIAGNOSIS — S91.331A PUNCTURE WOUND W/OUT FOREIGN BODY, RIGHT FOOT, INITIAL ENCOUNTER: ICD-10-CM

## 2023-02-16 DIAGNOSIS — Z00.00 ENCOUNTER FOR GENERAL ADULT MEDICAL EXAMINATION WITHOUT ABNORMAL FINDINGS: ICD-10-CM
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
## 2023-02-16 PROCEDURE — 99212 OFFICE O/P EST SF 10 MIN: CPT

## 2023-02-20 PROBLEM — S91.331A PUNCTURE WOUND OF RIGHT FOOT, INITIAL ENCOUNTER: Status: ACTIVE | Noted: 2023-02-03

## 2023-02-20 NOTE — PHYSICAL EXAM
[General Appearance - Alert] : alert [General Appearance - In No Acute Distress] : in no acute distress [FreeTextEntry1] : no pain at area of puncture site. No open wounds appreciated. no acute signs of infection

## 2023-02-20 NOTE — HISTORY OF PRESENT ILLNESS
[FreeTextEntry1] : 41 y/o female presents with penetrating injury to the right foot. patient stepped on a nail at her home. Incident occurred on saturday. patient went to the ED. xrays negative for foreign body. Patient was d/c with p.o antibiotics. Patient states that she was not given a tetanus shot. Does not recall her last vaccination date. \par \par 2/16 returns for follow up. pt has no complaints. denies any pain w/ weightbearing

## 2023-02-20 NOTE — ASSESSMENT
[FreeTextEntry1] : -puncture wound has healed\par -pt is asymptomatic \par -ok to return to work with no restrictions \par return as needed

## 2023-02-22 DIAGNOSIS — S91.331A PUNCTURE WOUND WITHOUT FOREIGN BODY, RIGHT FOOT, INITIAL ENCOUNTER: ICD-10-CM

## 2023-05-25 ENCOUNTER — APPOINTMENT (OUTPATIENT)
Dept: ORTHOPEDIC SURGERY | Facility: CLINIC | Age: 43
End: 2023-05-25

## 2023-05-26 ENCOUNTER — APPOINTMENT (OUTPATIENT)
Dept: ORTHOPEDIC SURGERY | Facility: CLINIC | Age: 43
End: 2023-05-26

## 2023-06-17 ENCOUNTER — EMERGENCY (EMERGENCY)
Facility: HOSPITAL | Age: 43
LOS: 0 days | Discharge: HOME | End: 2023-06-17
Attending: EMERGENCY MEDICINE
Payer: MEDICAID

## 2023-06-17 VITALS
HEART RATE: 90 BPM | SYSTOLIC BLOOD PRESSURE: 134 MMHG | TEMPERATURE: 98 F | RESPIRATION RATE: 17 BRPM | DIASTOLIC BLOOD PRESSURE: 67 MMHG | OXYGEN SATURATION: 98 %

## 2023-06-17 VITALS
WEIGHT: 293 LBS | HEART RATE: 102 BPM | TEMPERATURE: 98 F | OXYGEN SATURATION: 98 % | SYSTOLIC BLOOD PRESSURE: 139 MMHG | RESPIRATION RATE: 16 BRPM | DIASTOLIC BLOOD PRESSURE: 75 MMHG

## 2023-06-17 DIAGNOSIS — M54.50 LOW BACK PAIN, UNSPECIFIED: ICD-10-CM

## 2023-06-17 DIAGNOSIS — J45.909 UNSPECIFIED ASTHMA, UNCOMPLICATED: ICD-10-CM

## 2023-06-17 PROCEDURE — 99283 EMERGENCY DEPT VISIT LOW MDM: CPT | Mod: 25

## 2023-06-17 PROCEDURE — 96372 THER/PROPH/DIAG INJ SC/IM: CPT

## 2023-06-17 PROCEDURE — 99284 EMERGENCY DEPT VISIT MOD MDM: CPT

## 2023-06-17 RX ORDER — KETOROLAC TROMETHAMINE 30 MG/ML
30 SYRINGE (ML) INJECTION ONCE
Refills: 0 | Status: DISCONTINUED | OUTPATIENT
Start: 2023-06-17 | End: 2023-06-17

## 2023-06-17 RX ORDER — METHOCARBAMOL 500 MG/1
1000 TABLET, FILM COATED ORAL ONCE
Refills: 0 | Status: COMPLETED | OUTPATIENT
Start: 2023-06-17 | End: 2023-06-17

## 2023-06-17 RX ORDER — LIDOCAINE 4 G/100G
1 CREAM TOPICAL
Qty: 1 | Refills: 0
Start: 2023-06-17 | End: 2023-06-21

## 2023-06-17 RX ORDER — METHOCARBAMOL 500 MG/1
2 TABLET, FILM COATED ORAL
Qty: 18 | Refills: 0
Start: 2023-06-17 | End: 2023-06-19

## 2023-06-17 RX ORDER — LIDOCAINE 4 G/100G
1 CREAM TOPICAL ONCE
Refills: 0 | Status: COMPLETED | OUTPATIENT
Start: 2023-06-17 | End: 2023-06-17

## 2023-06-17 RX ADMIN — Medication 30 MILLIGRAM(S): at 22:00

## 2023-06-17 RX ADMIN — Medication 30 MILLIGRAM(S): at 21:42

## 2023-06-17 RX ADMIN — LIDOCAINE 1 PATCH: 4 CREAM TOPICAL at 21:43

## 2023-06-17 RX ADMIN — METHOCARBAMOL 1000 MILLIGRAM(S): 500 TABLET, FILM COATED ORAL at 21:42

## 2023-06-17 NOTE — ED PROVIDER NOTE - OBJECTIVE STATEMENT
42 y/o female with a PMH of asthma presents to the ED for evaluation of right lower back pain intermittently x 2 weeks. pt reports pain is worse with standing and lying down. pt lmp was 05/30, and is irregular. pt denies burning or pain with urination, urinary frequency, blood in the urine, weakness, numbness, tingling, heavy lifting, excessive exercising, rashes, recent trauma, chest pain, fever, iv drug use, or sob.

## 2023-06-17 NOTE — ED ADULT NURSE NOTE - NSFALLUNIVINTERV_ED_ALL_ED
Bed/Stretcher in lowest position, wheels locked, appropriate side rails in place/Call bell, personal items and telephone in reach/Instruct patient to call for assistance before getting out of bed/chair/stretcher/Non-slip footwear applied when patient is off stretcher/[place] to call system/Physically safe environment - no spills, clutter or unnecessary equipment/Purposeful proactive rounding/Room/bathroom lighting operational, light cord in reach

## 2023-06-17 NOTE — ED PROVIDER NOTE - PATIENT PORTAL LINK FT
You can access the FollowMyHealth Patient Portal offered by [facility] by registering at the following website: [email or web address]. By joining [employer]’s FollowMyHealth portal, you will also be able to view your health information using other applications (apps) compatible with our system.

## 2023-06-17 NOTE — ED PROVIDER NOTE - CLINICAL SUMMARY MEDICAL DECISION MAKING FREE TEXT BOX
43-year-old female with h/o asthma, and ER with c/o R>L lower back pain, to sacral area.  Pain worse with movements and certain positions.  No radiation down leg.  No motor weakness or paresthesias.  No F/C.  No bowel/bladder dysfunction.  No dysuria/frequency/hematuria.  No flank pain or upper back pain.  No abdominal pain.  No N/V/D.  No CP/SOB.  No HA/dizziness/syncope.  No recent fall/trauma.  PE - nad, nc/at, eomi, perrl, op - clear, mmm, neck supple, cta b/l, no w/r/r, rrr, abd- soft, nt/nd, nabs, no CVAT, no vertebral tenderness, mild R sacral tenderness, from x 4, no LE swelling/tenderness, A&O x 3, cn 2-12 intact, motor 5/5 b/l UE and LE sensation intact, no ataxia    -Patient given pain meds in ER, feeling much better on reevaluation, pain now resolved.  To DC home, patient to follow-up with PMD.  Told to return to ER for worsening pain, or any other new/concerning symptoms.  Patient understands and agrees with plan.

## 2023-06-17 NOTE — ED PROVIDER NOTE - NSFOLLOWUPCLINICS_GEN_ALL_ED_FT
[facility]  Medicine  [address], [place]   Phone: [phone]  Fax:   Follow Up Time: Routine

## 2023-06-17 NOTE — ED ADULT NURSE NOTE - SUICIDE SCREENING QUESTION 2
Attestation:  I have personally interviewed and examined the patient via face-to-face. I confirmed the findings listed below:     Paroxysmal atrial fibrillation (CMS/HCC)  (primary encounter diagnosis)  Visit for screening mammogram  Essential hypertension, benign  Stage 3 chronic kidney disease, unspecified whether stage 3a or 3b CKD (CMS/HCC)  Primary osteoarthritis involving multiple joints  Bilateral chronic knee pain  Hyperuricemia  Flank pain    I agree with the assessment and plan as documented. I completed and was present for the entire visit, the note was written by the scribe with the patient's consent, then sent to me to review and edited as appropriate and signed in the chart. At the time of the visit, the plan of care was discussed with the patient and/or patient's guardian if appropriate.     Sharon Live MD    OFFICE VISIT    Patient: Becca Ibarra   : 1957 MRN: [id number]    SUBJECTIVE:  Chief Complaint   Patient presents with   • MEDICATION ISSUE     Medication check up.        Patient has given consent to record this visit for documentation in their clinical record.    A 65 year old female presents for a follow-up of multiple medical conditions.    HISTORY OF PRESENT ILLNESS:   Historian: Self.    Paroxysmal atrial fibrillation-primary:  States has palpitations occasionally. Visited Dr. Lopez, informed about palpitations, and he advised take an extra tablet of metoprolol when she has symptoms. On metoprolol. Has symptoms three to four times a week. Uses extra metoprolol only if the palpitations last more than a couple of minutes/any chest pain. She did not take extra medication till now. Has an appointment with  in a week. The higher dose makes her lethargic. States has low dose of metoprolol. Blood work was done six months ago that revealed the lipids and CBC were normal,  usually checks the work done by her PCP.    Visit for screening mammogram: Due. Wishes to do the  mammogram in January 2023.    Essential hypertension, benign:  The blood pressure measured by MA today is 104/64 mmHg, is normal. Denies light headedness, dizziness, shortness of breath, any symptoms of low blood pressure. On lisinopril. Checks her blood pressure at wrist occasionally, it is around 120/60-70 mmHg.    Stage 3 chronic kidney disease, unspecified whether stage 3a or 3b CKD:  States she has kidney pain occasionally (three times a month) and is recently started. Drinks water to avoid it.    Primary osteoarthritis involving multiple joints:  States the joints still hurt. Uses 200 mg gabapentin twice a day, and Tylenol daily for the pain. If the pain is severe, then thrice a day, but uses occasionally.    Bilateral chronic knee pain:  States has knee pain all the time. States she has knee stiffness/pain with prolonged sitting. Requests a doctor's note to change her work schedule (more than three hours between breaks to allow to her walk around prevent joint/knee pain or stiffness) through email.    Hyperuricemia:  On allopurinol twice a day. Last labs were done a year ago.The medication was prescribed by Nephrology in the past to avoid kidney stones, was advised to follow-up if needed. She has urinary symptoms after she had kidney stones.    Flank pain:  States she has kidney pain occasionally(three times a month) and is recently started. Drinks water to avoid it. She has urinary symptoms after she had kidney stones. The last urine test was done to check protein in urine. Had a KOB X-ray. Has flank pain in the lumbar area of the back.     Additional comments:    Dental treatment:   Yesterday had a root canal of a tooth done, and went well. Used Tylenol a lot for her tooth pain    Deep Vein thrombosis:   On anticoagulant. The cardiology discussed discontinuation of blood thinner, but was not discontinued    GERD: States does not have acid reflux symptoms often.    Vitamin supplements: Takes vitamin D and  vitamin B12 twice a week.    Lipoma:  A 1.5 cm, round, mobile, subcutaneous, nodular, well circumscribed, tender lipoma in the lumbar region. Sometimes lower spine hurts.    Vaccination: Due for new booster dose of COVID-19 vaccine. States had fours vaccines of COVID-19 before. Had fatigue when she had this vaccine.    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 90 tablet 3   • lisinopril (ZESTRIL) 20 MG tablet Take 1 tablet by mouth daily. 90 tablet 3   • gabapentin (NEURONTIN) 250 MG/5ML solution Take 4 mLs by mouth in the morning and 4 mLs before bedtime. 240 mL 3   • pantoprazole (Protonix) 40 MG tablet Take 1 tablet by mouth every other day. 90 tablet 3   • allopurinol (ZYLOPRIM) 100 MG tablet Take 2 tablets by mouth daily. 180 tablet 3   • hydroCHLOROthiazide (HYDRODIURIL) 25 MG tablet Take 1 tablet by mouth daily. May take additional tablet for leg swelling as needed daily 3 times per week 120 tablet 3   • apixaBAN (Eliquis) 5 MG Tab Take 1 tablet by mouth 2 times daily. 180 tablet 3   • biotin 1000 MCG tablet Take 1,000 mcg by mouth 2 days a week.      • cyanocobalamin (VITAMIN B-12) 500 MCG tablet Take 500 mcg by mouth 2 days a week.      •  Multiple Vitamins-Minerals (MULTIVITAMINS) Chew Tab Chew 1 tablet by mouth daily.     • acetaminophen (TYLENOL) 500 MG tablet Take 1,000 mg by mouth every morning. (2 tablets = 1000 mg)     • Cholecalciferol (VITAMIN D) 2000 units capsule Take 4,000 Units by mouth daily. (2 capsules = 4000 units)       No current facility-administered medications for this visit.     ALLERGIES:  Allergies as of 11/04/2022 - Reviewed 11/04/2022   Allergen Reaction Noted   • Bee HIVES and SHORTNESS OF BREATH 06/14/2017   • Amlodipine Other (See Comments) 10/04/2018   • Carvedilol DIARRHEA 07/12/2019     FAMILY HISTORY:  Family History   Problem Relation Age of Onset   • Diabetes Mother         type 2   • Cancer Mother         Breast Cancer metastasized to bones, age 70   • Cancer Father         colon, age 75    • Hypertension Father    • Stroke Father    • High blood pressure Brother    • High blood pressure Sister    • Hypertension Maternal Grandmother    • Diabetes Maternal Grandmother    • Diabetes Maternal Grandfather    • Hypertension Maternal Grandfather    • Cancer Maternal Grandfather         lung      SOCIAL HISTORY:  Social History     Tobacco Use   • Smoking status: Never   • Smokeless tobacco: Never   • Tobacco comments:     non tobacco user   Vaping Use   • Vaping Use: never used   Substance Use Topics   • Alcohol use: Not Currently   • Drug use: No     Past Surgical HISTORY  Past Surgical History:   Procedure Laterality Date   • Colonoscopy diagnostic  05/30/2018    Dr. Al, Diverticulosis/Med Int Hemorrhoids/Fam Hx of GI Malig, F/U 5 years   • Cystoscopy w/ ureteral stent removal  10/12/2018    Dr TRACIE Fowler   • Cystoscopy,ureteroscopy,lithotripsy Left 09/14/2018    Dr. Fowler   • Cystourethroscopy, with ureteroscopy and/or pyeloscopy   Left 08/17/2018    left ureteroscopy w/ laser frag of stone and stent   • Esophagogastroduodenoscopy transoral flex w/bx single or mult  05/30/2018    Dr. Al,  Gastropathy/Ectopic Gastric Mucosa   • Extracapsular cataract removal w insert io lens prosth wo ecp Right 12/28/2020    ZCBOO +17.50 Dr. Fabricio Lozano   • Extracapsular cataract removal w insert io lens prosth wo ecp Left 01/18/2021    zcboo +18.00     • Lap francis en y gstrc bypas<150cm  07/28/2020    [facility]; Robotic RNYGB   • Lapa repair incisional hernia incar/ jeancarlos  06/25/2020    [facility]   • Laparoscopy, cholecystectomy  1993    Cholecystectomy, laparoscopic   • Past surgical history      New laser eye surgery   • Removal gallbladder         REVIEW OF SYSTEMS:    Cardiovascular: Per HPI.  Genitourinary: Per HPI.  Musculoskeletal: Per HPI.  Skin: Per HPI.  Neurological: Per HPI.    OBJECTIVE:  Visit Vitals  /64   Pulse 70   Wt (!) 151.7 kg (334 lb 6 oz)   LMP 11/28/2007   SpO2 98%   BMI 49.38 kg/m²       PHYSICAL EXAM:      Constitutional: Alert, in no acute distress and current vital signs reviewed.   Head and Face: Atraumatic and normocephalic.   Eyes: No discharge, no eyelid swelling and the sclerae were normal.   ENT: Oropharynx normal. Normal appearing outer ear. Tympanic membranes are bilaterally clear, normal appearing nose and normal lips.   Neck: Normal appearing neck and supple neck.   Pulmonary: Breath sounds clear to auscultation bilaterally, but no respiratory distress and normal respiratory rate and effort.   Cardiovascular: Normal rate, regular rhythm, normal S1, normal S2 and edema was not present in the lower extremities.   Abdomen: Soft and nontender.   Psychiatric: Alert and awake, interactive and mood/affect were appropriate.   Skin, Hair, Nails: A 1.5 cm, round, mobile, subcutaneous, nodular, well circumscribed, tender lipoma in the lumbar region.  Normal skin color and pigmentation.    DIAGNOSTIC STUDIES:  LAB RESULTS:  Lab Services on 11/04/2022   Component Date Value Ref Range Status   • Fasting Status 11/04/2022    Final   • Sodium 11/04/2022 141  135 - 145 mmol/L Final    • Potassium 11/04/2022 4.2  3.4 - 5.1 mmol/L Final   • Chloride 11/04/2022 110  97 - 110 mmol/L Final   • Carbon Dioxide 11/04/2022 24  21 - 32 mmol/L Final   • Anion Gap 11/04/2022 11  7 - 19 mmol/L Final   • Glucose 11/04/2022 76  70 - 99 mg/dL Final   • BUN 11/04/2022 33 (A)  6 - 20 mg/dL Final   • Creatinine 11/04/2022 1.35 (A)  0.51 - 0.95 mg/dL Final   • Glomerular Filtration Rate 11/04/2022 44 (A)  >=60 Final   • BUN/ Creatinine Ratio 11/04/2022 24  7 - 25 Final   • Calcium 11/04/2022 9.2  8.4 - 10.2 mg/dL Final   • Bilirubin, Total 11/04/2022 0.5  0.2 - 1.0 mg/dL Final   • GOT/AST 11/04/2022 20  <=37 Units/L Final   • GPT/ALT 11/04/2022 27  <64 Units/L Final   • Alkaline Phosphatase 11/04/2022 92  45 - 117 Units/L Final   • Albumin 11/04/2022 3.6  3.6 - 5.1 g/dL Final   • Protein, Total 11/04/2022 6.9  6.4 - 8.2 g/dL Final   • Globulin 11/04/2022 3.3  2.0 - 4.0 g/dL Final   • A/G Ratio 11/04/2022 1.1  1.0 - 2.4 Final   • Uric Acid 11/04/2022 5.9  2.6 - 5.9 mg/dL Final   • COLOR, URINALYSIS 11/04/2022 Straw   Final   • APPEARANCE, URINALYSIS 11/04/2022 Cloudy   Final   • GLUCOSE, URINALYSIS 11/04/2022 Negative  Negative mg/dL Final   • BILIRUBIN, URINALYSIS 11/04/2022 Negative  Negative Final   • KETONES, URINALYSIS 11/04/2022 Negative  Negative mg/dL Final   • SPECIFIC GRAVITY, URINALYSIS 11/04/2022 1.018  1.005 - 1.030 Final   • OCCULT BLOOD, URINALYSIS 11/04/2022 Moderate (A)  Negative Final   • PH, URINALYSIS 11/04/2022 6.0  5.0 - 7.0 Final   • PROTEIN, URINALYSIS 11/04/2022 Trace (A)  Negative mg/dL Final   • UROBILINOGEN, URINALYSIS 11/04/2022 0.2  0.2, 1.0 mg/dL Final   • NITRITE, URINALYSIS 11/04/2022 Negative  Negative Final   • LEUKOCYTE ESTERASE, URINALYSIS 11/04/2022 Large (A)  Negative Final   • SQUAMOUS EPITHELIAL, URINALYSIS 11/04/2022 1 to 5  None Seen, 1 to 5 /hpf Final   • ERYTHROCYTES, URINALYSIS 11/04/2022 26 to 100 (A)  None Seen, 1 to 2 /hpf Final   • LEUKOCYTES, URINALYSIS 11/04/2022  >100 (A)  None Seen, 1 to 5 /hpf Final   • BACTERIA, URINALYSIS 11/04/2022 None Seen  None Seen /hpf Final   • HYALINE CASTS, URINALYSIS 11/04/2022 1 to 5  None Seen, 1 to 5 /lpf Final   • MUCUS 11/04/2022 Present   Final   • Urine, Bacterial Culture 11/04/2022 10,000 - 50,000 CFU/mL Mixed bacterial babak with no predominating type   Final       ASSESSMENT AND PLAN:  This 65 year old female presents with :  1. Paroxysmal atrial fibrillation (CMS/HCC)    2. Visit for screening mammogram    3. Essential hypertension, benign    4. Stage 3 chronic kidney disease, unspecified whether stage 3a or 3b CKD (CMS/HCC)    5. Primary osteoarthritis involving multiple joints    6. Bilateral chronic knee pain    7. Hyperuricemia    8. Flank pain        Orders Placed This Encounter   • MAMMO Screen w Jorge Bilateral   • Comprehensive Metabolic Panel   • Uric Acid   • Urinalysis With Microscopy & Culture If Indicated       PLAN:    Paroxysmal atrial fibrillation-primary:  Reviewed and discussed previous reports.  Continue current medication.   Advised going to the appointment with  in a week, and discuss it and inquire about the line of treatment.  Advised discuss with him if he wants to increase the dose of metoprolol, then can decrease the dose of lisinopril as the blood pressure is stable.    Visit for screening mammogram:  Ordered mammogram screening W JORGE BILATERAL.    Essential hypertension, benign:  The condition is stable.  Continue current medication.   Ordered comprehensive metabolic panel.  Advised discuss with  if he wants to increase the dose of metoprolol, then can decrease the dose of lisinopril as the blood pressure is stable.    Stage 3 chronic kidney disease, unspecified whether stage 3a or 3b CKD:  Ordered comprehensive metabolic panel.    Primary osteoarthritis involving multiple joints:  Reviewed.  Continue current medication.     Bilateral chronic knee pain:  Continue current medication.    Provided doctor's note as requested by patient.     Hyperuricemia:  Continue current medication.   Ordered uric acid.Rationale explained.    Flank pain:  The pain can be musculoskeletal in origin  Ordered urinalysis with microscopy and culture if indicated. Rationale explained.    Deep Vein thrombosis:  Continue current medication.Rationale explained.    Lipoma:  Probably, a lipoma.  Recommended topical pain relief like Silon patch, CBD oil, diclofenac cream.    Vaccination:  Recommended taking new booster dose of COVID-19 vaccine today, but patient deferred it. So advised to take it next Friday.    Follow-up after six months for monitoring chronic medical conditions or physical exam.    Refer to orders.  Medical compliance with plan discussed and risks of non-compliance reviewed.  Patient education completed on disease process, etiology & prognosis.  Proper usage and side effects of medications reviewed & discussed.  Return to clinic as clinically indicated as discussed with the patient who verbalized understanding of the plan and is in agreement with the plan.    I,  Dr. Paris Tomlinson, have created a visit summary document based on the audio recording between Dr. Sharon Live MD and this patient for the physician to review, edit as needed, and authenticate.    Creation Date: 11/05/2022    No

## 2023-06-17 NOTE — ED PROVIDER NOTE - PROGRESS NOTE DETAILS
FF; pt reports pain has resolved. pt advised of return precautions discussed at bedside. agreeable to dc. f/u with pcp.

## 2023-06-17 NOTE — ED PROVIDER NOTE - PHYSICAL EXAMINATION
Physical Exam    Vital Signs: I have reviewed the initial vital signs.  Constitutional: well-nourished, appears stated age, no acute distress  Eyes: Conjunctiva pink, Sclera clear  Cardiovascular: S1 and S2, regular rate, regular rhythm, well-perfused extremities, radial pulses equal and 2+ b/l.   Respiratory: unlabored respiratory effort, clear to auscultation bilaterally no wheezing, rales and rhonchi. pt is speaking full sentences. no accessory muscle use.   Gastrointestinal: soft, non-tender, nondistended abdomen, no pulsatile mass, normal bowl sounds, no rebound, no guarding, no cva tenderness  Musculoskeletal: no lower extremity edema, no calf tenderness, no midline tenderness, no palpable spinal step offs, FROM of b/l upper and lower extremities. (+) mild rigght lower lumbar paraspinal tenderness.   Integumentary: warm, dry, no rash  Neurologic: awake, alert, 5/5 strength throughout, sensation equal and intact in b/l lower extremities, extremities’ motor and sensory functions grossly intact. steady gait.   Psychiatric: appropriate mood, appropriate affect

## 2023-11-10 ENCOUNTER — EMERGENCY (EMERGENCY)
Facility: HOSPITAL | Age: 43
LOS: 0 days | Discharge: HOME | End: 2023-11-10
Attending: STUDENT IN AN ORGANIZED HEALTH CARE EDUCATION/TRAINING PROGRAM
Payer: MEDICAID

## 2023-11-10 VITALS
SYSTOLIC BLOOD PRESSURE: 143 MMHG | WEIGHT: 293 LBS | DIASTOLIC BLOOD PRESSURE: 72 MMHG | RESPIRATION RATE: 16 BRPM | OXYGEN SATURATION: 96 % | HEART RATE: 92 BPM | HEIGHT: 68 IN | TEMPERATURE: 98 F

## 2023-11-10 DIAGNOSIS — H92.02 OTALGIA, LEFT EAR: ICD-10-CM

## 2023-11-10 DIAGNOSIS — H60.92 UNSPECIFIED OTITIS EXTERNA, LEFT EAR: ICD-10-CM

## 2023-11-10 PROCEDURE — 99284 EMERGENCY DEPT VISIT MOD MDM: CPT

## 2023-11-10 PROCEDURE — 99283 EMERGENCY DEPT VISIT LOW MDM: CPT

## 2023-11-10 RX ORDER — CIPROFLOXACIN LACTATE 400MG/40ML
1 VIAL (ML) INTRAVENOUS
Qty: 14 | Refills: 0
Start: 2023-11-10 | End: 2023-11-16

## 2023-11-10 NOTE — ED PROVIDER NOTE - NSFOLLOWUPINSTRUCTIONS_ED_ALL_ED_FT
Please follow up with ENT clinic in the next 1-3 days     **** Our Emergency Department Referral Coordinators will be reaching out to you in the next 24-48 hours from 9:00am to 5:00pm with a follow up appointment. Please expect a phone call from the hospital in that time frame. If you do not receive a call or if you have any questions or concerns, you can reach them at [phone]     Otitis Externa    Otitis externa is a bacterial or fungal infection of the outer ear canal. This is the area from the eardrum to the outside of the ear. Otitis externa is sometimes called "swimmer's ear." Causes include swimming in dirty water, moisture remaining in ear after swimming or bathing, trauma to the ear, objects stuck in the ear, cuts or scrapes in our outside of the ear. Symptoms include itching, pain, swelling, redness in the ear canal, and fluid coming from the ear. To prevent recurrence of otitis media keep your ear dry, avoid scratching or putting objects inside your ear including cotton swabs, and avoid swimming in polluted water or poorly chlorinated pools.    SEEK MEDICAL CARE IF YOU HAVE THE FOLLOWING SYMPTOMS: fever, worsening symptoms, headache, redness/swelling/pain over the bone behind your ear.

## 2023-11-10 NOTE — ED PROVIDER NOTE - PATIENT PORTAL LINK FT
You can access the FollowMyHealth Patient Portal offered by [facility] by registering at the following website: [email or web address]. By joining [facility]’s FollowMyHealth portal, you will also be able to view your health information using other applications (apps) compatible with our system.

## 2023-11-10 NOTE — ED PROVIDER NOTE - OBJECTIVE STATEMENT
43 F hx of asthma presents to ED  with L ear pain. Pt states that for the past couple of days she has been having L ear pain. Of note, pt has been using various item, yumiko pins and q-tips to clean her ears. Pt denies any fevers, chills, nausea, vomiting or any other medical complaints.

## 2023-11-10 NOTE — ED PROVIDER NOTE - CLINICAL SUMMARY MEDICAL DECISION MAKING FREE TEXT BOX
43 yr old f that presents with L ear pain. concern for otitis media. will dc pt with po antibiotics. pcp follow up and strict return precautions.  Appropriate medications for patient's presenting complaints were ordered and effects were reassessed.  Patient's records (prior hospital, ED visit, and/or nursing home notes if available) were reviewed.  Additional history was obtained from EMS, family, and/or PCP (where available).  Escalation to admission/observation was considered. However patient feels much better and is comfortable with discharge.  Appropriate follow-up was arranged.     I have discussed the discharge plan with the patient. The patient agrees with the plan, as discussed.  The patient understands Emergency Department diagnosis is a preliminary diagnosis often based on limited information and that the patient must adhere to the follow-up plan as discussed.  The patient understands that if the symptoms worsen or if prescribed medications do not have the desired/planned effect that the patient may return to the Emergency Department at any time for further evaluation and treatment.

## 2023-11-10 NOTE — ED PROVIDER NOTE - PHYSICAL EXAMINATION
VITAL SIGNS: I have reviewed nursing notes and confirm.  CONSTITUTIONAL: in no acute distress.  SKIN: Skin exam is warm and dry, no acute rash.  HEAD: Normocephalic; atraumatic.  EYES: PERRL, EOM intact; conjunctiva and sclera clear.  ENT: No nasal discharge; airway clear. left ear canal erythematous and ttp with otoscope   NECK: Supple; non tender.  CARD: S1, S2 normal; no murmurs, gallops, or rubs. Regular rate and rhythm.  RESP: No wheezes, rales or rhonchi. Speaking in full sentences.   ABD: soft; non-distended; non-tender;  EXT: Normal ROM. No clubbing, cyanosis or edema.  NEURO: Alert, oriented. Grossly unremarkable. No focal deficits.   PSYCH: Cooperative, appropriate.

## 2023-11-10 NOTE — ED ADULT NURSE NOTE - NSFALLUNIVINTERV_ED_ALL_ED
Bed/Stretcher in lowest position, wheels locked, appropriate side rails in place/Call bell, personal items and telephone in reach/Instruct patient to call for assistance before getting out of bed/chair/stretcher/Non-slip footwear applied when patient is off stretcher/[place] to call system/Physically safe environment - no spills, clutter or unnecessary equipment/Purposeful proactive rounding/Room/bathroom lighting operational, light cord in reach

## 2023-11-10 NOTE — ED PROVIDER NOTE - ATTENDING APP SHARED VISIT CONTRIBUTION OF CARE
43 yr old f w/ a pmh significant for asthma who presents with L ear pain. Pt states that for the past couple of days she has been having L ear pain. Of note, pt has been using various item, yumiko pins and q-tips to clean her ears. Pt denies any fevers, chills, nausea, vomiting or any other medical complaints.     VITAL SIGNS: I have reviewed nursing notes and confirm.  CONSTITUTIONAL: non-toxic, well appearing  SKIN: no rash, no petechiae.  EYES: EOMI, pink conjunctiva, anicteric  ENT: tongue midline, no exudates, MMM. L TM, bulging, red, however, no discharge, no mastoid tenderness, no swelling   NECK: Supple; no meningismus, no JVD  RESP:  no respiratory distress  EXT: Normal ROM x4. No edema. No calves tenderness  NEURO: Alert, oriented x3. CN2-12 intact, equal strength bilaterally, nl gait.    43 yr old f that presents with L ear pain. concern for otitis media. will dc pt with po antibiotics. pcp follow up and strict return precautions.

## 2024-01-19 NOTE — ED ADULT NURSE NOTE - CARDIO ASSESSMENT
Encounter Date: 1/19/2024       History     Chief Complaint   Patient presents with    Hypotension     Pt presents after home health nurse reports hypotension and states the patient looks more pale than usual. Pt only complaint is lower abdominal pain and dysuria onset today.      Pt here from home for eval low BP. Pt says she just feels a little weak. No CP. She has SOB but this is chronic from COPD. Family says she appears a little pale. Pt denies rectal bleeding or black stools. She has some mild LAP and dysuria, however. No fever but she has had some chills. No back pain. No syncope but she does feel like she will pass out if she stands up too fast. She says she no longer takes BP meds.     The history is provided by the patient.     Review of patient's allergies indicates:  No Known Allergies  Past Medical History:   Diagnosis Date    Acquired hypothyroidism     Anxiety     Anxiety and depression     Cervical radicular pain     Closed left ankle fracture     COPD (chronic obstructive pulmonary disease)     H/O: stroke 11/08/2023    Hypertension     Hypokalemia     Hyponatremia     Multiple rib fractures     Requires continuous at home supplemental oxygen     PRN FOR COPD    Stroke     Tension type headache     Traumatic closed displaced fracture of distal end of radius, left, sequela      Past Surgical History:   Procedure Laterality Date    COLON SURGERY      COLOSTOMY      COLOSTOMY CLOSURE      HYSTERECTOMY      LEG SURGERY Right     OPEN REDUCTION AND INTERNAL FIXATION (ORIF) OF INJURY OF WRIST Left 1/29/2019    Procedure: ORIF, WRIST;  Surgeon: Homero Jeff DO;  Location: [facility];  Service: Orthopedics;  Laterality: Left;  Equipment: [employer] Geminus Wrist Plate Set  Vendor/Rep: [employer]  C-Arm: Entire  DME: None    REQUIRES ASSISTANT    WRIST SURGERY Right      Family History   Problem Relation Age of Onset    Cancer Mother     COPD Father     Cirrhosis Father     Arthritis Sister      No Known Problems Brother     Anxiety disorder Daughter     Depression Daughter     Anxiety disorder Daughter     Congenital heart disease Daughter     Valvular heart disease Daughter      Social History     Tobacco Use    Smoking status: Every Day     Current packs/day: 1.00     Average packs/day: 1 pack/day for 20.0 years (20.0 ttl pk-yrs)     Types: Cigarettes    Smokeless tobacco: Never   Substance Use Topics    Alcohol use: Yes     Comment: couple times a week-wine or bloody cari    Drug use: No     Review of Systems   Constitutional:  Positive for chills and fatigue.   Respiratory:  Positive for shortness of breath.    Genitourinary:  Positive for dysuria.   Neurological:  Positive for light-headedness.   All other systems reviewed and are negative.      Physical Exam     Initial Vitals [01/19/24 1656]   BP Pulse Resp Temp SpO2   (!) 87/70 109 18 98.5 °F (36.9 °C) (!) 90 %      MAP       --         Physical Exam    Nursing note and vitals reviewed.  Constitutional: She appears well-developed and well-nourished.   Chronically ill appearing. Mildly intoxicated appearing. In nad.   HENT:   Head: Normocephalic and atraumatic.   OP clear, MM dry   Eyes: EOM are normal. No scleral icterus.   Pale conjunctiva   Neck: Neck supple. No JVD present.   Normal range of motion.  Cardiovascular:  Regular rhythm, normal heart sounds and intact distal pulses.           tachy   Pulmonary/Chest: No respiratory distress. She exhibits no tenderness.   Diminished but clear, anteriorly   Abdominal: Abdomen is soft. Bowel sounds are normal. She exhibits no distension. There is no abdominal tenderness.   Musculoskeletal:         General: No tenderness or edema. Normal range of motion.      Cervical back: Normal range of motion and neck supple.     Neurological: She is alert and oriented to person, place, and time. GCS score is 15. GCS eye subscore is 4. GCS verbal subscore is 5. GCS motor subscore is 6.   Skin: Skin is warm and  dry. Capillary refill takes 2 to 3 seconds. No rash noted. No erythema. No pallor.   Psychiatric: She has a normal mood and affect.         ED Course   Procedures  Labs Reviewed   CBC W/ AUTO DIFFERENTIAL - Abnormal; Notable for the following components:       Result Value    RBC 3.21 (*)     Hemoglobin 10.4 (*)     Hematocrit 31.3 (*)     MCH 32.4 (*)     Gran # (ANC) 8.4 (*)     Gran % 85.4 (*)     Lymph % 9.6 (*)     All other components within normal limits   COMPREHENSIVE METABOLIC PANEL - Abnormal; Notable for the following components:    Sodium 133 (*)     Potassium 2.8 (*)     Chloride 90 (*)     Glucose 128 (*)     Albumin 2.9 (*)     Alkaline Phosphatase 142 (*)      (*)      (*)     All other components within normal limits   MAGNESIUM - Abnormal; Notable for the following components:    Magnesium 1.4 (*)     All other components within normal limits   URINALYSIS, REFLEX TO URINE CULTURE - Abnormal; Notable for the following components:    Appearance, UA Hazy (*)     Protein, UA 2+ (*)     Bilirubin (UA) 1+ (*)     Occult Blood UA 3+ (*)     Leukocytes, UA 1+ (*)     All other components within normal limits    Narrative:     Preferred Collection Type->Urine, Clean Catch  Specimen Source->Urine   URINALYSIS MICROSCOPIC - Abnormal; Notable for the following components:    RBC, UA 20 (*)     WBC, UA >100 (*)     Bacteria Many (*)     All other components within normal limits    Narrative:     Preferred Collection Type->Urine, Clean Catch  Specimen Source->Urine   CULTURE, URINE   CULTURE, BLOOD   CULTURE, BLOOD   B-TYPE NATRIURETIC PEPTIDE   TROPONIN I   DRUG SCREEN PANEL, URINE EMERGENCY    Narrative:     Preferred Collection Type->Urine, Clean Catch  Specimen Source->Urine   ALCOHOL,MEDICAL (ETHANOL)   LACTIC ACID, PLASMA   LIPASE   LIPASE     EKG Readings: (Independently Interpreted)   Rhythm: Sinus Tachycardia. Heart Rate: 101. Ectopy: No Ectopy. Conduction: Normal. ST Segment Depression:  V3, V4, V5, V6 and II. T Waves: Normal. Axis: Normal. Clinical Impression: Sinus Tachycardia Other Impression: with inferolateral ST depression       Imaging Results              CT Abdomen Pelvis With IV Contrast NO Oral Contrast (Final result)  Result time 01/19/24 19:46:16      Final result by Jhon Crockett MD (01/19/24 19:46:16)                   Impression:      Left kidney appears mildly edematous. Mild associated infectious/inflammatory left perinephric fat stranding.  Please correlate for acute pyelonephritis.  Follow-up to resolution advised.      Electronically signed by: Jhon Crockett  Date:    01/19/2024  Time:    19:46               Narrative:    EXAMINATION:  CT ABDOMEN PELVIS WITH IV CONTRAST    CLINICAL HISTORY:  Abdominal pain, acute, nonlocalized;    TECHNIQUE:  Low dose axial images, sagittal and coronal reformations were obtained from the lung bases to the pubic symphysis following the IV administration of 75 mL of Omnipaque 350 .  Oral contrast was not administered.    COMPARISON:  10/25/2023    FINDINGS:  Abdomen:    Lung bases: Mm subpleural nodule at the right lung base may be infectious or inflammatory.  No adverse change from prior.  Mild left and minimal right basilar atelectasis.    - Liver: No focal mass.    - Gallbladder: No calcified gallstones.    - Bile Ducts: No evidence of intra or extra hepatic biliary ductal dilation.    - Spleen: Negative.    - Kidneys: Left kidney appears mildly edematous.  Mild associated infectious/inflammatory left perinephric fat stranding.  Punctate nonobstructive stone in the superior pole right kidney versus vascular calcification.    - Adrenals: Unremarkable.    - Pancreas: No mass or peripancreatic fat stranding.    - Retroperitoneum:  No significant adenopathy.    - Vascular: No abdominal aortic aneurysm.    - Abdominal wall:  Unremarkable.    Pelvis:    Hysterectomy.    Bowel/Mesentery:    No evidence of bowel obstruction or  inflammation.  Postsurgical changes of colon.    Bones:  Multiple chronic bilateral rib fracture deformities.  None osteopenia with chronic compression fracture deformities of T11 and L2.                                       X-Ray Chest AP Portable (Final result)  Result time 01/19/24 18:34:24      Final result by Jhon Crockett MD (01/19/24 18:34:24)                   Impression:      As above.      Electronically signed by: Jhon Crockett  Date:    01/19/2024  Time:    18:34               Narrative:    EXAMINATION:  XR CHEST AP PORTABLE    CLINICAL HISTORY:  sob;    TECHNIQUE:  Single frontal view of the chest was performed.    COMPARISON:  10/31/2023 .    FINDINGS:  Enlarged cardiac silhouette with pulmonary vascular congestion. Mild diffuse interstitial prominence compatible with edema and/or pneumonitis.  Left mid lung scarring and/or atelectasis.  Suspect trace bilateral pleural effusions.  No focal consolidation.  Chronic left-sided rib fracture deformities.                                       Medications   lactated ringers bolus 1,000 mL (has no administration in time range)   magnesium sulfate 2g in water 50mL IVPB (premix) (has no administration in time range)   potassium chloride SA CR tablet 40 mEq (has no administration in time range)   sodium chloride 0.9% bolus 1,000 mL 1,000 mL (0 mLs Intravenous Stopped 1/19/24 1842)   albuterol-ipratropium 2.5 mg-0.5 mg/3 mL nebulizer solution 3 mL (3 mLs Nebulization Given 1/19/24 1825)   cefTRIAXone (ROCEPHIN) 2 g in dextrose 5 % in water (D5W) 100 mL IVPB (MB+) (2 g Intravenous New Bag 1/19/24 1927)   iohexoL (OMNIPAQUE 350) injection 75 mL (75 mLs Intravenous Given 1/19/24 1918)     Medical Decision Making  Pt presented from home for eval hypotension. Dry appearing on exam. Normal BUN/Cr. BP 80s. She was given total of 2L crystalloid in the ED. UA showed infection. Rocephin 2g given. No leukocytosis. Mild anemia which is chronic. Mg 1.4 and she  was given 2g IV. CMP showed K 2.8, AlkP 142, , . Normal bili. Blood Cxs and lactate added but sepsis not suspected cause of her hypotension. Lactate was normal. Troponin, BNP and UDS neg. ETOh and lipase also neg. EKG showed mild inferolateral ST depression. Pt without CP. ACS not suspected. BP 90s after IVFs. CT abd showed possible pyelonephritis. Plan is to admit for IVFs and abx and Cx f/u.     Amount and/or Complexity of Data Reviewed  Labs: ordered. Decision-making details documented in ED Course.  Radiology: ordered. Decision-making details documented in ED Course.    Risk  Decision regarding hospitalization.               ED Course as of 01/19/24 2004 Fri Jan 19, 2024   1743 Old records reviewed. Last echo in Nov showed normal EF.  [DC]   1820 SIRS positive but sepsis not suspected. She does have UTI. Cxs and lactate added. Rocephin given.  [DC]   1846 MgSO4 2g ordered.  [DC]   1846 Previous LFTs normal. Benign abd exam. She does still have her GB. Will get CT abd to look for RUQ pathology. Unable to get US at night. [DC]      ED Course User Index  [DC] Jacek Coburn Jr., MD                           Clinical Impression:  Final diagnoses:  [I95.9] Hypotension  [N10] Acute pyelonephritis (Primary)  [E83.42] Hypomagnesemia  [E87.6] Hypokalemia          ED Disposition Condition    Observation Stable                Jacek Coburn Jr., MD  01/19/24 2004     ---

## 2024-04-22 NOTE — ED ADULT NURSE NOTE - CCCP TRG CHIEF CMPLNT
Patient: BRIANA CHASE    MR# [id number]    Jerome Baltazar ANABEL POPE            1837     ankle pain/injury

## 2024-07-15 NOTE — ED PROVIDER NOTE - IV ALTEPLASE DOOR HIDDEN
Pharmacy checked on cost of Vortioxetine 5 mg from patient's home medication list. According to patient's pharmacy, the copay is $0 for a 1 month supply. Medication was last filled on 6/24/2024. Next possible fill date is 7/18/2024. No other issues identified at this time.      Thank you,    Mary Ann Garcia, Pharmacy Intern  07/15/24  10:45 EDT    show

## 2024-07-26 ENCOUNTER — EMERGENCY (EMERGENCY)
Facility: HOSPITAL | Age: 44
LOS: 0 days | Discharge: HOME | End: 2024-07-26
Attending: STUDENT IN AN ORGANIZED HEALTH CARE EDUCATION/TRAINING PROGRAM
Payer: MEDICAID

## 2024-07-26 VITALS
DIASTOLIC BLOOD PRESSURE: 93 MMHG | TEMPERATURE: 98 F | HEART RATE: 88 BPM | HEIGHT: 68 IN | RESPIRATION RATE: 16 BRPM | OXYGEN SATURATION: 99 % | SYSTOLIC BLOOD PRESSURE: 152 MMHG | WEIGHT: 289.91 LBS

## 2024-07-26 DIAGNOSIS — R07.9 CHEST PAIN, UNSPECIFIED: ICD-10-CM

## 2024-07-26 DIAGNOSIS — Z20.822 CONTACT WITH AND (SUSPECTED) EXPOSURE TO COVID-19: ICD-10-CM

## 2024-07-26 DIAGNOSIS — R06.02 SHORTNESS OF BREATH: ICD-10-CM

## 2024-07-26 DIAGNOSIS — J45.909 UNSPECIFIED ASTHMA, UNCOMPLICATED: ICD-10-CM

## 2024-07-26 DIAGNOSIS — R07.89 OTHER CHEST PAIN: ICD-10-CM

## 2024-07-26 DIAGNOSIS — R42 DIZZINESS AND GIDDINESS: ICD-10-CM

## 2024-07-26 LAB
ALBUMIN SERPL ELPH-MCNC: 4.1 G/DL — SIGNIFICANT CHANGE UP (ref 3.5–5.2)
ALP SERPL-CCNC: 75 U/L — SIGNIFICANT CHANGE UP (ref 30–115)
ALT FLD-CCNC: <5 U/L — SIGNIFICANT CHANGE UP (ref 0–41)
ANION GAP SERPL CALC-SCNC: 12 MMOL/L — SIGNIFICANT CHANGE UP (ref 7–14)
AST SERPL-CCNC: <5 U/L — SIGNIFICANT CHANGE UP (ref 0–41)
BASOPHILS # BLD AUTO: 0.04 K/UL — SIGNIFICANT CHANGE UP (ref 0–0.2)
BASOPHILS NFR BLD AUTO: 0.9 % — SIGNIFICANT CHANGE UP (ref 0–1)
BILIRUB SERPL-MCNC: <0.2 MG/DL — SIGNIFICANT CHANGE UP (ref 0.2–1.2)
BUN SERPL-MCNC: 7 MG/DL — LOW (ref 10–20)
CALCIUM SERPL-MCNC: 8.7 MG/DL — SIGNIFICANT CHANGE UP (ref 8.4–10.4)
CHLORIDE SERPL-SCNC: 101 MMOL/L — SIGNIFICANT CHANGE UP (ref 98–110)
CO2 SERPL-SCNC: 21 MMOL/L — SIGNIFICANT CHANGE UP (ref 17–32)
CREAT SERPL-MCNC: 0.7 MG/DL — SIGNIFICANT CHANGE UP (ref 0.7–1.5)
EGFR: 111 ML/MIN/1.73M2 — SIGNIFICANT CHANGE UP
EOSINOPHIL # BLD AUTO: 0.11 K/UL — SIGNIFICANT CHANGE UP (ref 0–0.7)
EOSINOPHIL NFR BLD AUTO: 2.5 % — SIGNIFICANT CHANGE UP (ref 0–8)
FLUAV AG NPH QL: SIGNIFICANT CHANGE UP
FLUBV AG NPH QL: SIGNIFICANT CHANGE UP
GLUCOSE SERPL-MCNC: 83 MG/DL — SIGNIFICANT CHANGE UP (ref 70–99)
HCG SERPL-ACNC: <1 MIU/ML — SIGNIFICANT CHANGE UP
HCT VFR BLD CALC: 37 % — SIGNIFICANT CHANGE UP (ref 37–47)
HGB BLD-MCNC: 11.9 G/DL — LOW (ref 12–16)
IMM GRANULOCYTES NFR BLD AUTO: 0.2 % — SIGNIFICANT CHANGE UP (ref 0.1–0.3)
LYMPHOCYTES # BLD AUTO: 2.42 K/UL — SIGNIFICANT CHANGE UP (ref 1.2–3.4)
LYMPHOCYTES # BLD AUTO: 56 % — HIGH (ref 20.5–51.1)
MCHC RBC-ENTMCNC: 29 PG — SIGNIFICANT CHANGE UP (ref 27–31)
MCHC RBC-ENTMCNC: 32.2 G/DL — SIGNIFICANT CHANGE UP (ref 32–37)
MCV RBC AUTO: 90.2 FL — SIGNIFICANT CHANGE UP (ref 81–99)
MONOCYTES # BLD AUTO: 0.39 K/UL — SIGNIFICANT CHANGE UP (ref 0.1–0.6)
MONOCYTES NFR BLD AUTO: 9 % — SIGNIFICANT CHANGE UP (ref 1.7–9.3)
NEUTROPHILS # BLD AUTO: 1.35 K/UL — LOW (ref 1.4–6.5)
NEUTROPHILS NFR BLD AUTO: 31.4 % — LOW (ref 42.2–75.2)
NRBC # BLD: 0 /100 WBCS — SIGNIFICANT CHANGE UP (ref 0–0)
PLATELET # BLD AUTO: 242 K/UL — SIGNIFICANT CHANGE UP (ref 130–400)
PMV BLD: 12 FL — HIGH (ref 7.4–10.4)
POTASSIUM SERPL-MCNC: SIGNIFICANT CHANGE UP MMOL/L (ref 3.5–5)
POTASSIUM SERPL-SCNC: SIGNIFICANT CHANGE UP MMOL/L (ref 3.5–5)
PROT SERPL-MCNC: 7.4 G/DL — SIGNIFICANT CHANGE UP (ref 6–8)
RBC # BLD: 4.1 M/UL — LOW (ref 4.2–5.4)
RBC # FLD: 15 % — HIGH (ref 11.5–14.5)
RSV RNA NPH QL NAA+NON-PROBE: SIGNIFICANT CHANGE UP
SARS-COV-2 RNA SPEC QL NAA+PROBE: SIGNIFICANT CHANGE UP
SODIUM SERPL-SCNC: 134 MMOL/L — LOW (ref 135–146)
TROPONIN T, HIGH SENSITIVITY RESULT: <6 NG/L — SIGNIFICANT CHANGE UP (ref 6–13)
TROPONIN T, HIGH SENSITIVITY RESULT: <6 NG/L — SIGNIFICANT CHANGE UP (ref 6–13)
WBC # BLD: 4.32 K/UL — LOW (ref 4.8–10.8)
WBC # FLD AUTO: 4.32 K/UL — LOW (ref 4.8–10.8)

## 2024-07-26 PROCEDURE — 36415 COLL VENOUS BLD VENIPUNCTURE: CPT

## 2024-07-26 PROCEDURE — 0241U: CPT

## 2024-07-26 PROCEDURE — 96374 THER/PROPH/DIAG INJ IV PUSH: CPT

## 2024-07-26 PROCEDURE — 71045 X-RAY EXAM CHEST 1 VIEW: CPT | Mod: 26

## 2024-07-26 PROCEDURE — 85025 COMPLETE CBC W/AUTO DIFF WBC: CPT

## 2024-07-26 PROCEDURE — 93010 ELECTROCARDIOGRAM REPORT: CPT | Mod: 77

## 2024-07-26 PROCEDURE — 84702 CHORIONIC GONADOTROPIN TEST: CPT

## 2024-07-26 PROCEDURE — 99285 EMERGENCY DEPT VISIT HI MDM: CPT

## 2024-07-26 PROCEDURE — 84484 ASSAY OF TROPONIN QUANT: CPT

## 2024-07-26 PROCEDURE — 93010 ELECTROCARDIOGRAM REPORT: CPT

## 2024-07-26 PROCEDURE — 96375 TX/PRO/DX INJ NEW DRUG ADDON: CPT

## 2024-07-26 PROCEDURE — 93005 ELECTROCARDIOGRAM TRACING: CPT

## 2024-07-26 PROCEDURE — 99285 EMERGENCY DEPT VISIT HI MDM: CPT | Mod: 25

## 2024-07-26 PROCEDURE — 71045 X-RAY EXAM CHEST 1 VIEW: CPT

## 2024-07-26 PROCEDURE — 80053 COMPREHEN METABOLIC PANEL: CPT

## 2024-07-26 RX ORDER — FAMOTIDINE 40 MG
20 TABLET ORAL ONCE
Refills: 0 | Status: COMPLETED | OUTPATIENT
Start: 2024-07-26 | End: 2024-07-26

## 2024-07-26 RX ORDER — ACETAMINOPHEN 325 MG
650 TABLET ORAL ONCE
Refills: 0 | Status: COMPLETED | OUTPATIENT
Start: 2024-07-26 | End: 2024-07-26

## 2024-07-26 RX ORDER — DEXTROSE MONOHYDRATE AND SODIUM CHLORIDE 5; .3 G/100ML; G/100ML
1000 INJECTION, SOLUTION INTRAVENOUS ONCE
Refills: 0 | Status: COMPLETED | OUTPATIENT
Start: 2024-07-26 | End: 2024-07-26

## 2024-07-26 RX ORDER — METOCLOPRAMIDE 5 MG/5ML
10 SOLUTION ORAL ONCE
Refills: 0 | Status: COMPLETED | OUTPATIENT
Start: 2024-07-26 | End: 2024-07-26

## 2024-07-26 RX ORDER — KETOROLAC TROMETHAMINE 30 MG/ML
15 INJECTION, SOLUTION INTRAMUSCULAR ONCE
Refills: 0 | Status: DISCONTINUED | OUTPATIENT
Start: 2024-07-26 | End: 2024-07-26

## 2024-07-26 RX ADMIN — METOCLOPRAMIDE 10 MILLIGRAM(S): 5 SOLUTION ORAL at 12:21

## 2024-07-26 RX ADMIN — Medication 650 MILLIGRAM(S): at 12:20

## 2024-07-26 RX ADMIN — DEXTROSE MONOHYDRATE AND SODIUM CHLORIDE 1000 MILLILITER(S): 5; .3 INJECTION, SOLUTION INTRAVENOUS at 12:21

## 2024-07-26 RX ADMIN — Medication 20 MILLIGRAM(S): at 12:22

## 2024-07-26 RX ADMIN — KETOROLAC TROMETHAMINE 15 MILLIGRAM(S): 30 INJECTION, SOLUTION INTRAMUSCULAR at 12:20

## 2024-07-26 NOTE — ED PROVIDER NOTE - CLINICAL SUMMARY MEDICAL DECISION MAKING FREE TEXT BOX
44-year-old female history of asthma presents the ED complaining of chest pain and lightheadedness since this morning.  Chest pain is persistent.  Describes it as a ball in your chest like 1 after you drink water.  Nonradiating, nonexertional, nonpleuritic.  States she has baseline shortness of breath which is unchanged which she attributes to her body habitus has not had pain like this before.  No recent illness.  No cardiac history, no family history of early heart disease.  No leg swelling or calf pain, no history of DVT or PE, no hemoptysis, no malignancy history, no OCP use, no recent travel or trauma or surgery, no abdominal pain, no nausea vomiting or diarrhea, no bleeding events    On exam, vitals are within normal limits.  Patient well-appearing, no acute distress.  Heart regular rate and rhythm, lungs good auscultation bilaterally, abdomen soft and nontender, extremities warm and well-perfused, no lower extremity edema.  Nonfocal neuroexam.    Differential includes ACS, doubt PE given she is low risk for PE [address] [place] and is PERC negative, doubt CHF given no lower extremity edema, no orthopnea, no paroxysmal nocturnal dyspnea, could be musculoskeletal given reproducibility of pain on exam, could be GERD or gastritis, peptic ulcer disease.    Plan for labs, EKG, chest x-ray, symptom control, reassess    I independently interpreted patient's EKG as normal sinus rhythm with a right axis, rate of 79, OR interval 158, QRS 76, QTc 419, T wave inversion in lead III which is new compared to prior.  No other acute changes.

## 2024-07-26 NOTE — ED PROVIDER NOTE - OBJECTIVE STATEMENT
44-year-old female history of asthma presents the ED complaining of chest pain and lightheadedness since this morning.  Chest pain is persistent.  Describes it as a ball in your chest like 1 after you drink water.  Nonradiating, nonexertional, nonpleuritic.  States she has baseline shortness of breath which is unchanged which she attributes to her body habitus has not had pain like this before.  No recent illness.  No cardiac history, no family history of early heart disease.  No leg swelling or calf pain, no history of DVT or PE, no hemoptysis, no malignancy history, no OCP use, no recent travel or trauma or surgery, no abdominal pain, no nausea vomiting or diarrhea, no bleeding events

## 2024-07-26 NOTE — ED PROVIDER NOTE - PATIENT PORTAL LINK FT
You can access the FollowMyHealth Patient Portal offered by [facility] by registering at the following website: [email or web address]. By joining [email or web address]’s FollowMyHealth portal, you will also be able to view your health information using other applications (apps) compatible with our system.

## 2024-07-26 NOTE — ED PROVIDER NOTE - PROGRESS NOTE DETAILS
BF: EKG, troponin x2, and CXR all WNL. Pt feels much better after medications. INstructed how to fu covid swab at home. Pt amenable to dc home.   labs and imaging reviewed with pt. given good instructions when to return to ED and importance of f/u.  all incidental findings were discussed with pt as well. pt verbalized understanding. patient was given opportunity to ask questions. BF: EKG, troponin x2, and CXR all WNL. Pt feels much better after medications. INstructed how to fu covid swab at home. Pt amenable to dc home. HEART score 1 (obesity)  labs and imaging reviewed with pt. given good instructions when to return to ED and importance of f/u.  all incidental findings were discussed with pt as well. pt verbalized understanding. patient was given opportunity to ask questions.

## 2024-07-26 NOTE — ED PROVIDER NOTE - NSFOLLOWUPINSTRUCTIONS_ED_ALL_ED_FT
Chest Pain    Chest pain can be caused by many different conditions which may or may not be dangerous. Causes include heartburn, lung infections, heart attack, blood clot in lungs, skin infections, strain or damage to muscle, cartilage, or bones, etc. In addition to a history and physical examination, an electrocardiogram (ECG) or other lab tests may have been performed to determine the cause of your chest pain. Follow up with your primary care provider or with a cardiologist as instructed.     SEEK IMMEDIATE MEDICAL CARE IF YOU HAVE ANY OF THE FOLLOWING SYMPTOMS: worsening chest pain, coughing up blood, unexplained back/neck/jaw pain, severe abdominal pain, dizziness or lightheadedness, fainting, shortness of breath, sweaty or clammy skin, vomiting, or racing heart beat. These symptoms may represent a serious problem that is an emergency. Do not wait to see if the symptoms will go away. Get medical help right away. Call 911 and do not drive yourself to the hospital. Chest Pain    Chest pain can be caused by many different conditions which may or may not be dangerous. Causes include heartburn, lung infections, heart attack, blood clot in lungs, skin infections, strain or damage to muscle, cartilage, or bones, etc. In addition to a history and physical examination, an electrocardiogram (ECG) or other lab tests may have been performed to determine the cause of your chest pain. Follow up with your primary care provider or with a cardiologist as instructed.     SEEK IMMEDIATE MEDICAL CARE IF YOU HAVE ANY OF THE FOLLOWING SYMPTOMS: worsening chest pain, coughing up blood, unexplained back/neck/jaw pain, severe abdominal pain, dizziness or lightheadedness, fainting, shortness of breath, sweaty or clammy skin, vomiting, or racing heart beat. These symptoms may represent a serious problem that is an emergency. Do not wait to see if the symptoms will go away. Get medical help right away. Call 911 and do not drive yourself to the hospital.    Our Emergency Department Referral Coordinators will be reaching out to you in the next 24-48 hours from 9:00am to 5:00pm with a follow up appointment. Please expect a phone call from the hospital in that time frame. If you do not receive a call or if you have any questions or concerns, you can reach them at   [phone]
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
## 2024-07-26 NOTE — ED PROVIDER NOTE - PHYSICAL EXAMINATION
CONSTITUTIONAL: Well-appearing; well-nourished; in no apparent distress.   HEAD: Normocephalic; atraumatic.   EYES: PERRL; EOM intact. Conjunctiva normal B/L.   ENT: Normal pharynx with no tonsillar hypertrophy.  NECK: Supple; non-tender  CARDIOVASCULAR: Normal S1, S2; no murmurs, rubs, or gallops.   RESPIRATORY: Normal chest excursion with respiration; breath sounds clear and equal bilaterally; no wheezes, rhonchi, or rales.  GI/: soft, non-distended; non-tender.  BACK: No evidence of trauma or deformity. Non-tender to palpation. No CVA tenderness.   EXT: Normal ROM in all four extremities; non-tender to palpation; distal pulses are normal. No leg edema B/L.   SKIN: Normal for age and race; warm; dry  NEURO: A & O x 4;  Grossly unremarkable.

## 2024-07-26 NOTE — ED ADULT TRIAGE NOTE - CHIEF COMPLAINT QUOTE
Patient c/o midsternal CP x 2 days with SOB. Patient denies cough , fevers and chills. Pain is present at rest and when active, reproducible with deep breathing

## 2024-08-02 NOTE — CHART NOTE - NSCHARTNOTEFT_GEN_A_CORE
[facility] MRN [id number] / Emailed Patricia Palacios 7/29 - [place] / Pt will call back 8/2 - [facility]    Specialty: PCP